# Patient Record
Sex: MALE | Race: WHITE | NOT HISPANIC OR LATINO | Employment: OTHER | URBAN - METROPOLITAN AREA
[De-identification: names, ages, dates, MRNs, and addresses within clinical notes are randomized per-mention and may not be internally consistent; named-entity substitution may affect disease eponyms.]

---

## 2017-05-17 ENCOUNTER — RECORDS - HEALTHEAST (OUTPATIENT)
Dept: LAB | Facility: CLINIC | Age: 82
End: 2017-05-17

## 2017-05-17 ENCOUNTER — HOSPITAL ENCOUNTER (OUTPATIENT)
Dept: LAB | Age: 82
Setting detail: SPECIMEN
Discharge: HOME | End: 2017-05-17

## 2017-05-17 LAB
CHOLEST SERPL-MCNC: 155 MG/DL
FASTING STATUS PATIENT QL REPORTED: NORMAL
HDLC SERPL-MCNC: 60 MG/DL
LDLC SERPL CALC-MCNC: 81 MG/DL
TRIGL SERPL-MCNC: 68 MG/DL

## 2017-09-26 ENCOUNTER — RECORDS - HEALTHEAST (OUTPATIENT)
Dept: LAB | Facility: CLINIC | Age: 82
End: 2017-09-26

## 2017-09-26 LAB
CHOLEST SERPL-MCNC: 161 MG/DL
FASTING STATUS PATIENT QL REPORTED: NORMAL
HDLC SERPL-MCNC: 60 MG/DL
LDLC SERPL CALC-MCNC: 84 MG/DL
PSA SERPL-MCNC: <0.1 NG/ML (ref 0–6.5)
TRIGL SERPL-MCNC: 83 MG/DL

## 2018-05-10 ENCOUNTER — RECORDS - HEALTHEAST (OUTPATIENT)
Dept: LAB | Facility: CLINIC | Age: 83
End: 2018-05-10

## 2018-05-10 LAB
ANION GAP SERPL CALCULATED.3IONS-SCNC: 7 MMOL/L (ref 5–18)
BUN SERPL-MCNC: 14 MG/DL (ref 8–28)
CALCIUM SERPL-MCNC: 8.6 MG/DL (ref 8.5–10.5)
CHLORIDE BLD-SCNC: 106 MMOL/L (ref 98–107)
CHOLEST SERPL-MCNC: 144 MG/DL
CO2 SERPL-SCNC: 27 MMOL/L (ref 22–31)
CREAT SERPL-MCNC: 0.79 MG/DL (ref 0.7–1.3)
FASTING STATUS PATIENT QL REPORTED: NORMAL
GFR SERPL CREATININE-BSD FRML MDRD: >60 ML/MIN/1.73M2
GLUCOSE BLD-MCNC: 100 MG/DL (ref 70–125)
HDLC SERPL-MCNC: 55 MG/DL
LDLC SERPL CALC-MCNC: 77 MG/DL
MAGNESIUM SERPL-MCNC: 2.2 MG/DL (ref 1.8–2.6)
POTASSIUM BLD-SCNC: 3.6 MMOL/L (ref 3.5–5)
SODIUM SERPL-SCNC: 140 MMOL/L (ref 136–145)
TRIGL SERPL-MCNC: 61 MG/DL

## 2018-10-16 ENCOUNTER — RECORDS - HEALTHEAST (OUTPATIENT)
Dept: LAB | Facility: CLINIC | Age: 83
End: 2018-10-16

## 2018-10-16 LAB
ANION GAP SERPL CALCULATED.3IONS-SCNC: 9 MMOL/L (ref 5–18)
BUN SERPL-MCNC: 15 MG/DL (ref 8–28)
CALCIUM SERPL-MCNC: 9.1 MG/DL (ref 8.5–10.5)
CHLORIDE BLD-SCNC: 104 MMOL/L (ref 98–107)
CHOLEST SERPL-MCNC: 160 MG/DL
CO2 SERPL-SCNC: 29 MMOL/L (ref 22–31)
CREAT SERPL-MCNC: 0.91 MG/DL (ref 0.7–1.3)
FASTING STATUS PATIENT QL REPORTED: NORMAL
GFR SERPL CREATININE-BSD FRML MDRD: >60 ML/MIN/1.73M2
GLUCOSE BLD-MCNC: 98 MG/DL (ref 70–125)
HDLC SERPL-MCNC: 64 MG/DL
LDLC SERPL CALC-MCNC: 82 MG/DL
POTASSIUM BLD-SCNC: 3.7 MMOL/L (ref 3.5–5)
SODIUM SERPL-SCNC: 142 MMOL/L (ref 136–145)
TRIGL SERPL-MCNC: 72 MG/DL

## 2019-05-09 ENCOUNTER — RECORDS - HEALTHEAST (OUTPATIENT)
Dept: LAB | Facility: CLINIC | Age: 84
End: 2019-05-09

## 2019-05-09 LAB
ANION GAP SERPL CALCULATED.3IONS-SCNC: 11 MMOL/L (ref 5–18)
BUN SERPL-MCNC: 13 MG/DL (ref 8–28)
CALCIUM SERPL-MCNC: 9.2 MG/DL (ref 8.5–10.5)
CHLORIDE BLD-SCNC: 105 MMOL/L (ref 98–107)
CHOLEST SERPL-MCNC: 161 MG/DL
CO2 SERPL-SCNC: 26 MMOL/L (ref 22–31)
CREAT SERPL-MCNC: 0.79 MG/DL (ref 0.7–1.3)
FASTING STATUS PATIENT QL REPORTED: NORMAL
GFR SERPL CREATININE-BSD FRML MDRD: >60 ML/MIN/1.73M2
GLUCOSE BLD-MCNC: 116 MG/DL (ref 70–125)
HDLC SERPL-MCNC: 68 MG/DL
LDLC SERPL CALC-MCNC: 79 MG/DL
MAGNESIUM SERPL-MCNC: 2.5 MG/DL (ref 1.8–2.6)
POTASSIUM BLD-SCNC: 3.3 MMOL/L (ref 3.5–5)
SODIUM SERPL-SCNC: 142 MMOL/L (ref 136–145)
TRIGL SERPL-MCNC: 71 MG/DL

## 2019-06-10 ENCOUNTER — RECORDS - HEALTHEAST (OUTPATIENT)
Dept: LAB | Facility: CLINIC | Age: 84
End: 2019-06-10

## 2019-06-10 LAB — POTASSIUM BLD-SCNC: 3.7 MMOL/L (ref 3.5–5)

## 2019-07-03 ENCOUNTER — ANESTHESIA - HEALTHEAST (OUTPATIENT)
Dept: SURGERY | Facility: CLINIC | Age: 84
End: 2019-07-03

## 2019-07-03 ENCOUNTER — SURGERY - HEALTHEAST (OUTPATIENT)
Dept: SURGERY | Facility: CLINIC | Age: 84
End: 2019-07-03

## 2019-07-03 ASSESSMENT — MIFFLIN-ST. JEOR: SCORE: 1467.09

## 2019-07-15 ENCOUNTER — OFFICE VISIT - HEALTHEAST (OUTPATIENT)
Dept: SURGERY | Facility: CLINIC | Age: 84
End: 2019-07-15

## 2019-07-15 DIAGNOSIS — Z98.890 POSTOPERATIVE STATE: ICD-10-CM

## 2019-07-15 ASSESSMENT — MIFFLIN-ST. JEOR: SCORE: 1461.64

## 2019-10-16 ENCOUNTER — RECORDS - HEALTHEAST (OUTPATIENT)
Dept: LAB | Facility: CLINIC | Age: 84
End: 2019-10-16

## 2019-10-16 LAB
ANION GAP SERPL CALCULATED.3IONS-SCNC: 9 MMOL/L (ref 5–18)
BUN SERPL-MCNC: 13 MG/DL (ref 8–28)
CALCIUM SERPL-MCNC: 8.8 MG/DL (ref 8.5–10.5)
CHLORIDE BLD-SCNC: 104 MMOL/L (ref 98–107)
CHOLEST SERPL-MCNC: 149 MG/DL
CO2 SERPL-SCNC: 24 MMOL/L (ref 22–31)
CREAT SERPL-MCNC: 0.9 MG/DL (ref 0.7–1.3)
FASTING STATUS PATIENT QL REPORTED: NORMAL
GFR SERPL CREATININE-BSD FRML MDRD: >60 ML/MIN/1.73M2
GLUCOSE BLD-MCNC: 100 MG/DL (ref 70–125)
HDLC SERPL-MCNC: 60 MG/DL
LDLC SERPL CALC-MCNC: 73 MG/DL
POTASSIUM BLD-SCNC: 3.3 MMOL/L (ref 3.5–5)
SODIUM SERPL-SCNC: 137 MMOL/L (ref 136–145)
TRIGL SERPL-MCNC: 82 MG/DL

## 2019-12-24 ENCOUNTER — RECORDS - HEALTHEAST (OUTPATIENT)
Dept: LAB | Facility: CLINIC | Age: 84
End: 2019-12-24

## 2019-12-24 LAB
ANION GAP SERPL CALCULATED.3IONS-SCNC: 8 MMOL/L (ref 5–18)
BUN SERPL-MCNC: 13 MG/DL (ref 8–28)
CALCIUM SERPL-MCNC: 9.1 MG/DL (ref 8.5–10.5)
CHLORIDE BLD-SCNC: 105 MMOL/L (ref 98–107)
CO2 SERPL-SCNC: 28 MMOL/L (ref 22–31)
CREAT SERPL-MCNC: 0.83 MG/DL (ref 0.7–1.3)
GFR SERPL CREATININE-BSD FRML MDRD: >60 ML/MIN/1.73M2
GLUCOSE BLD-MCNC: 128 MG/DL (ref 70–125)
IRON SATN MFR SERPL: 16 % (ref 20–50)
IRON SERPL-MCNC: 47 UG/DL (ref 42–175)
POTASSIUM BLD-SCNC: 4.2 MMOL/L (ref 3.5–5)
SODIUM SERPL-SCNC: 141 MMOL/L (ref 136–145)
TIBC SERPL-MCNC: 291 UG/DL (ref 313–563)
TRANSFERRIN SERPL-MCNC: 233 MG/DL (ref 212–360)

## 2020-04-17 ENCOUNTER — RECORDS - HEALTHEAST (OUTPATIENT)
Dept: LAB | Facility: CLINIC | Age: 85
End: 2020-04-17

## 2020-04-17 LAB
ANION GAP SERPL CALCULATED.3IONS-SCNC: 10 MMOL/L (ref 5–18)
BUN SERPL-MCNC: 13 MG/DL (ref 8–28)
CALCIUM SERPL-MCNC: 8.6 MG/DL (ref 8.5–10.5)
CHLORIDE BLD-SCNC: 106 MMOL/L (ref 98–107)
CHOLEST SERPL-MCNC: 153 MG/DL
CO2 SERPL-SCNC: 27 MMOL/L (ref 22–31)
CREAT SERPL-MCNC: 0.86 MG/DL (ref 0.7–1.3)
FASTING STATUS PATIENT QL REPORTED: NORMAL
GFR SERPL CREATININE-BSD FRML MDRD: >60 ML/MIN/1.73M2
GLUCOSE BLD-MCNC: 100 MG/DL (ref 70–125)
HDLC SERPL-MCNC: 55 MG/DL
LDLC SERPL CALC-MCNC: 83 MG/DL
MAGNESIUM SERPL-MCNC: 2.2 MG/DL (ref 1.8–2.6)
POTASSIUM BLD-SCNC: 3.5 MMOL/L (ref 3.5–5)
SODIUM SERPL-SCNC: 143 MMOL/L (ref 136–145)
TRIGL SERPL-MCNC: 75 MG/DL

## 2020-10-15 ENCOUNTER — RECORDS - HEALTHEAST (OUTPATIENT)
Dept: LAB | Facility: CLINIC | Age: 85
End: 2020-10-15

## 2020-10-15 LAB
ANION GAP SERPL CALCULATED.3IONS-SCNC: 12 MMOL/L (ref 5–18)
BUN SERPL-MCNC: 15 MG/DL (ref 8–28)
CALCIUM SERPL-MCNC: 9 MG/DL (ref 8.5–10.5)
CHLORIDE BLD-SCNC: 105 MMOL/L (ref 98–107)
CHOLEST SERPL-MCNC: 152 MG/DL
CO2 SERPL-SCNC: 28 MMOL/L (ref 22–31)
CREAT SERPL-MCNC: 0.91 MG/DL (ref 0.7–1.3)
FASTING STATUS PATIENT QL REPORTED: NORMAL
GFR SERPL CREATININE-BSD FRML MDRD: >60 ML/MIN/1.73M2
GLUCOSE BLD-MCNC: 121 MG/DL (ref 70–125)
HDLC SERPL-MCNC: 56 MG/DL
LDLC SERPL CALC-MCNC: 85 MG/DL
POTASSIUM BLD-SCNC: 4 MMOL/L (ref 3.5–5)
SODIUM SERPL-SCNC: 145 MMOL/L (ref 136–145)
TRIGL SERPL-MCNC: 57 MG/DL

## 2021-04-06 ENCOUNTER — RECORDS - HEALTHEAST (OUTPATIENT)
Dept: LAB | Facility: CLINIC | Age: 86
End: 2021-04-06

## 2021-04-06 LAB
ANION GAP SERPL CALCULATED.3IONS-SCNC: 9 MMOL/L (ref 5–18)
BUN SERPL-MCNC: 14 MG/DL (ref 8–28)
CALCIUM SERPL-MCNC: 8.5 MG/DL (ref 8.5–10.5)
CHLORIDE BLD-SCNC: 107 MMOL/L (ref 98–107)
CHOLEST SERPL-MCNC: 155 MG/DL
CO2 SERPL-SCNC: 26 MMOL/L (ref 22–31)
CREAT SERPL-MCNC: 0.91 MG/DL (ref 0.7–1.3)
FASTING STATUS PATIENT QL REPORTED: NORMAL
GFR SERPL CREATININE-BSD FRML MDRD: >60 ML/MIN/1.73M2
GLUCOSE BLD-MCNC: 104 MG/DL (ref 70–125)
HDLC SERPL-MCNC: 55 MG/DL
LDLC SERPL CALC-MCNC: 86 MG/DL
MAGNESIUM SERPL-MCNC: 2.2 MG/DL (ref 1.8–2.6)
POTASSIUM BLD-SCNC: 3.5 MMOL/L (ref 3.5–5)
SODIUM SERPL-SCNC: 142 MMOL/L (ref 136–145)
TRIGL SERPL-MCNC: 71 MG/DL

## 2021-05-28 ENCOUNTER — RECORDS - HEALTHEAST (OUTPATIENT)
Dept: ADMINISTRATIVE | Facility: CLINIC | Age: 86
End: 2021-05-28

## 2021-05-30 ENCOUNTER — RECORDS - HEALTHEAST (OUTPATIENT)
Dept: ADMINISTRATIVE | Facility: CLINIC | Age: 86
End: 2021-05-30

## 2021-05-30 NOTE — PROGRESS NOTES
"HISTORY:  Blair Quiles is s/p open right inguinal hernia repair on July 3.  He is doing well.  He thinks he is as good as could be expected.  He took Tylenol for a couple of days after surgery.  He has been adhering to the 15 pound lifting restrictions.    PHYSICAL EXAM:  Vitals:    07/15/19 1411   BP: 90/70   Resp: 18   SpO2: 100%   Weight: 183 lb (83 kg)   Height: 5' 7.5\" (1.715 m)     GEN: No acute distress, comfortable  LUNGS: CTA bilaterally  CV: RRR  ABD: Right inguinal incision healing well without evidence of recurrent hernia with Valsalva maneuver  EXT: No cyanosis, edema or obvious abnormalities    PATHOLOGY:  \"CORD LIPOMA,\" HERNIA REPAIR AND EXCISION:     -  AGGREGATES OF ABUNDANT MATURE ADIPOSE TISSUE CONSISTENT WITH CORD LIPOMA, WITH MARKED VASCULAR CONGESTION    ASSESSMENT:  Blair Quiles is s/p open right anal hernia repair    PLAN:  Intraoperative findings discussed with patient  Maintain lifting restrictions until the beginning of August.  At that time, he can advance his weight lifting as tolerated.  Okay for showering and soaking incision in water as needed  Return to surgery clinic as needed    Sabina Dominguez,   [facility]  [phone]    "

## 2021-05-30 NOTE — ANESTHESIA POSTPROCEDURE EVALUATION
Patient: Blair Quiles  REPAIR, HERNIA, INGUINAL, OPEN  Anesthesia type: general    Patient location: PACU  Last vitals:   Vitals Value Taken Time   /66 7/3/2019 12:15 PM   Temp 36.9  C (98.5  F) 7/3/2019 12:15 PM   Pulse 71 7/3/2019 12:15 PM   Resp 16 7/3/2019 12:15 PM   SpO2 92 % 7/3/2019 12:15 PM     Post vital signs: stable  Level of consciousness: awake and responds to simple questions  Post-anesthesia pain: pain controlled  Post-anesthesia nausea and vomiting: no  Pulmonary: unassisted, return to baseline  Cardiovascular: stable and blood pressure at baseline  Hydration: adequate  Anesthetic events: no    QCDR Measures:  ASA# 11 - Terese-op Cardiac Arrest: ASA11B - Patient did NOT experience unanticipated cardiac arrest  ASA# 12 - Terese-op Mortality Rate: ASA12B - Patient did NOT die  ASA# 13 - PACU Re-Intubation Rate: ASA13B - Patient did NOT require a new airway mgmt  ASA# 10 - Composite Anes Safety: ASA10A - No serious adverse event    Additional Notes:

## 2021-05-30 NOTE — ANESTHESIA CARE TRANSFER NOTE
Last vitals:   Vitals:    07/03/19 0920   BP: 166/79   Pulse: 82   Resp: 16   Temp: 37.1  C (98.7  F)   SpO2: 96%     Patient's level of consciousness is awake  Spontaneous respirations: yes  Maintains airway independently: yes  Dentition unchanged: yes  Oropharynx: oropharynx clear of all foreign objects    QCDR Measures:  ASA# 20 - Surgical Safety Checklist: WHO surgical safety checklist completed prior to induction    PQRS# 430 - Adult PONV Prevention: 4558F - Pt received => 2 anti-emetic agents (different classes) preop & intraop  ASA# 8 - Peds PONV Prevention: NA - Not pediatric patient, not GA or 2 or more risk factors NOT present  PQRS# 424 - Terese-op Temp Management: 4559F - At least one body temp DOCUMENTED => 35.5C or 95.9F within required timeframe  PQRS# 426 - PACU Transfer Protocol: - Transfer of care checklist used  ASA# 14 - Acute Post-op Pain: ASA14B - Patient did NOT experience pain >= 7 out of 10

## 2021-05-30 NOTE — ANESTHESIA PREPROCEDURE EVALUATION
Anesthesia Evaluation      Patient summary reviewed   No history of anesthetic complications     Airway   Mallampati: II  Neck ROM: full   Pulmonary - negative ROS and normal exam                          Cardiovascular - normal exam   Neuro/Psych - negative ROS     Endo/Other - negative ROS      GI/Hepatic/Renal - negative ROS           Dental    (+) poor dentition                       Anesthesia Plan  Planned anesthetic: peripheral nerve block and general LMA    ASA 2   Induction: intravenous   Anesthetic plan and risks discussed with: patient

## 2021-06-03 VITALS — BODY MASS INDEX: 27.92 KG/M2 | WEIGHT: 184.2 LBS | HEIGHT: 68 IN

## 2021-06-03 VITALS — WEIGHT: 183 LBS | BODY MASS INDEX: 27.74 KG/M2 | HEIGHT: 68 IN

## 2021-06-16 PROBLEM — K40.30 INCARCERATED RIGHT INGUINAL HERNIA: Status: ACTIVE | Noted: 2019-07-03

## 2021-06-16 PROBLEM — K40.90 NON-RECURRENT UNILATERAL INGUINAL HERNIA WITHOUT OBSTRUCTION OR GANGRENE: Status: ACTIVE | Noted: 2019-07-03

## 2021-10-12 ENCOUNTER — LAB REQUISITION (OUTPATIENT)
Dept: LAB | Facility: CLINIC | Age: 86
End: 2021-10-12
Payer: MEDICARE

## 2021-10-12 DIAGNOSIS — M85.80 OTHER SPECIFIED DISORDERS OF BONE DENSITY AND STRUCTURE, UNSPECIFIED SITE: ICD-10-CM

## 2021-10-12 DIAGNOSIS — I10 ESSENTIAL (PRIMARY) HYPERTENSION: ICD-10-CM

## 2021-10-12 DIAGNOSIS — E78.49 OTHER HYPERLIPIDEMIA: ICD-10-CM

## 2021-10-12 DIAGNOSIS — D50.8 OTHER IRON DEFICIENCY ANEMIAS: ICD-10-CM

## 2021-10-12 LAB
ANION GAP SERPL CALCULATED.3IONS-SCNC: 12 MMOL/L (ref 5–18)
BUN SERPL-MCNC: 10 MG/DL (ref 8–28)
CALCIUM SERPL-MCNC: 8.9 MG/DL (ref 8.5–10.5)
CHLORIDE BLD-SCNC: 105 MMOL/L (ref 98–107)
CHOLEST SERPL-MCNC: 165 MG/DL
CO2 SERPL-SCNC: 24 MMOL/L (ref 22–31)
CREAT SERPL-MCNC: 0.84 MG/DL (ref 0.7–1.3)
GFR SERPL CREATININE-BSD FRML MDRD: 77 ML/MIN/1.73M2
GLUCOSE BLD-MCNC: 118 MG/DL (ref 70–125)
HDLC SERPL-MCNC: 67 MG/DL
IRON SATN MFR SERPL: 56 % (ref 20–50)
IRON SERPL-MCNC: 162 UG/DL (ref 42–175)
LDLC SERPL CALC-MCNC: 82 MG/DL
POTASSIUM BLD-SCNC: 3.4 MMOL/L (ref 3.5–5)
SODIUM SERPL-SCNC: 141 MMOL/L (ref 136–145)
TIBC SERPL-MCNC: 291 UG/DL (ref 313–563)
TRANSFERRIN SERPL-MCNC: 233 MG/DL (ref 212–360)
TRIGL SERPL-MCNC: 81 MG/DL
VIT B12 SERPL-MCNC: 665 PG/ML (ref 213–816)

## 2021-10-12 PROCEDURE — 82306 VITAMIN D 25 HYDROXY: CPT | Mod: ORL | Performed by: FAMILY MEDICINE

## 2021-10-12 PROCEDURE — 80061 LIPID PANEL: CPT | Mod: ORL | Performed by: FAMILY MEDICINE

## 2021-10-12 PROCEDURE — 84466 ASSAY OF TRANSFERRIN: CPT | Mod: ORL | Performed by: FAMILY MEDICINE

## 2021-10-12 PROCEDURE — 82607 VITAMIN B-12: CPT | Mod: ORL | Performed by: FAMILY MEDICINE

## 2021-10-12 PROCEDURE — 80048 BASIC METABOLIC PNL TOTAL CA: CPT | Mod: ORL | Performed by: FAMILY MEDICINE

## 2021-10-13 LAB — DEPRECATED CALCIDIOL+CALCIFEROL SERPL-MC: 41 UG/L (ref 30–80)

## 2021-11-15 ENCOUNTER — LAB REQUISITION (OUTPATIENT)
Dept: LAB | Facility: CLINIC | Age: 86
End: 2021-11-15
Payer: MEDICARE

## 2021-11-15 DIAGNOSIS — R89.9 UNSPECIFIED ABNORMAL FINDING IN SPECIMENS FROM OTHER ORGANS, SYSTEMS AND TISSUES: ICD-10-CM

## 2021-11-15 LAB
ANION GAP SERPL CALCULATED.3IONS-SCNC: 9 MMOL/L (ref 5–18)
BUN SERPL-MCNC: 11 MG/DL (ref 8–28)
CALCIUM SERPL-MCNC: 9.2 MG/DL (ref 8.5–10.5)
CHLORIDE BLD-SCNC: 105 MMOL/L (ref 98–107)
CO2 SERPL-SCNC: 27 MMOL/L (ref 22–31)
CREAT SERPL-MCNC: 0.84 MG/DL (ref 0.7–1.3)
GFR SERPL CREATININE-BSD FRML MDRD: 77 ML/MIN/1.73M2
GLUCOSE BLD-MCNC: 106 MG/DL (ref 70–125)
POTASSIUM BLD-SCNC: 4 MMOL/L (ref 3.5–5)
SODIUM SERPL-SCNC: 141 MMOL/L (ref 136–145)

## 2021-11-15 PROCEDURE — 80048 BASIC METABOLIC PNL TOTAL CA: CPT | Mod: ORL | Performed by: FAMILY MEDICINE

## 2022-03-23 ENCOUNTER — HOSPITAL ENCOUNTER (EMERGENCY)
Facility: CLINIC | Age: 87
Discharge: HOME | End: 2022-03-23
Attending: STUDENT IN AN ORGANIZED HEALTH CARE EDUCATION/TRAINING PROGRAM | Admitting: STUDENT IN AN ORGANIZED HEALTH CARE EDUCATION/TRAINING PROGRAM
Payer: MEDICARE

## 2022-03-23 ENCOUNTER — APPOINTMENT (OUTPATIENT)
Dept: CT IMAGING | Facility: CLINIC | Age: 87
End: 2022-03-23
Attending: STUDENT IN AN ORGANIZED HEALTH CARE EDUCATION/TRAINING PROGRAM
Payer: MEDICARE

## 2022-03-23 VITALS
RESPIRATION RATE: 18 BRPM | DIASTOLIC BLOOD PRESSURE: 66 MMHG | WEIGHT: 175 LBS | OXYGEN SATURATION: 96 % | HEART RATE: 55 BPM | SYSTOLIC BLOOD PRESSURE: 152 MMHG | TEMPERATURE: 98 F | BODY MASS INDEX: 27.47 KG/M2 | HEIGHT: 67 IN

## 2022-03-23 DIAGNOSIS — S00.03XA HEMATOMA OF SCALP, INITIAL ENCOUNTER: ICD-10-CM

## 2022-03-23 PROCEDURE — 70450 CT HEAD/BRAIN W/O DYE: CPT

## 2022-03-23 PROCEDURE — 99284 EMERGENCY DEPT VISIT MOD MDM: CPT | Mod: 25

## 2022-03-23 ASSESSMENT — ENCOUNTER SYMPTOMS
SHORTNESS OF BREATH: 0
WOUND: 1
WEAKNESS: 0
HEADACHES: 0
NAUSEA: 0
NECK PAIN: 0
NUMBNESS: 0
BACK PAIN: 0

## 2022-03-23 NOTE — ED TRIAGE NOTES
Patient rolled out of bed at 07:00 hitting head on arm of chair.    Was having a dream and rolled out of bed.  Sustained a abrasion to the right temporal area of head. `1 cm  Does have swelling around area.    Is on baby asprin daily.

## 2022-03-23 NOTE — ED NOTES
Patient was having a bad dream in his bed rolled over and hit his head on a fabric covered chair. Has a small abrasion on the right temporal area with some swelling. No Neuro deficits. Pain 1/10 no change in vision denies Headache at this time.

## 2022-03-23 NOTE — ED PROVIDER NOTES
EMERGENCY DEPARTMENT ENCOUNTER      NAME: Blair Quiles  AGE: 89 year old male  YOB: 1931  MRN: [id number]  EVALUATION DATE & TIME: 3/23/2022  8:57 AM    PCP: No primary care provider on file.    ED PROVIDER: Naresh Velásquez M.D.      Chief Complaint   Patient presents with     Fall     @ 07:00  hit head on arm of chair         FINAL IMPRESSION:  1. Hematoma of scalp, initial encounter          ED COURSE & MEDICAL DECISION MAKING:    Pertinent Labs & Imaging studies reviewed. (See chart for details)  89 year old male presents to the Emergency Department for evaluation of head pain.  Patient had a mechanical fall.  Patient had a hematoma to his right temple.  No other pains or signs of trauma.  CT scan of the head shows no intracranial process.  Will discharge patient with hematoma care instructions.    9:08 AM I introduced myself to the patient, performed my physical exam, and discussed plan for ED workup.  10:07 AM Rechecked and updated the patient. We discussed the plan for discharge and the patient is agreeable. Reviewed supportive cares, symptomatic treatment, outpatient follow up, and reasons to return to the Emergency Department. Patient to be discharged by ED RN.    At the conclusion of the encounter I discussed the results of all of the tests and the disposition. The questions were answered. The patient or family acknowledged understanding and was agreeable with the care plan.           MEDICATIONS GIVEN IN THE EMERGENCY:  Medications - No data to display    NEW PRESCRIPTIONS STARTED AT TODAY'S ER VISIT  Discharge Medication List as of 3/23/2022 10:32 AM             =================================================================    HPI    Patient information was obtained from: Patient    Use of : N/A       Blair Quiles is a 89 year old male with a pertinent history of HTN, cardiac arrhythmia, hyperlipidemia, and iron deficiency anemia who presents to this ED by walk in for  evaluation after a fall.    The patient reports he was having a bad dream and fell out of bed, hitting the right side of his head on the arm of a chair. He immediately woke up and did not lose consciousness. The fall occurred around 0700 this morning. He noticed a welt to the right side of his head and has applied ice to th area for 20 minutes two times prior to arrival. He does not have any pain around the welt or headache.     He denies neck pain, chest pain, back pain, headache, vision changes, extremity numbness, weakness, or tinging, nausea, and any other complaints at this time.    He currently takes a bay aspirin daily, no other blood thinning medications.     He also reports a fall two weeks ago and hit the top of his head.    REVIEW OF SYSTEMS   Review of Systems   Eyes: Negative for visual disturbance.   Respiratory: Negative for shortness of breath.    Gastrointestinal: Negative for nausea.   Musculoskeletal: Negative for back pain and neck pain.   Skin: Positive for wound (welt to right side of head).   Neurological: Negative for syncope, weakness, numbness and headaches.   All other systems reviewed and are negative.       PAST MEDICAL HISTORY:  History reviewed. No pertinent past medical history.    PAST SURGICAL HISTORY:  Past Surgical History:   Procedure Laterality Date     INGUINAL HERNIA REPAIR Right 7/3/2019    Procedure: REPAIR, HERNIA, INGUINAL, OPEN;  Surgeon: Sabina Dominguez DO;  [facility]: [facility];  Service: General     PROSTATECTOMY             CURRENT MEDICATIONS:    amLODIPine (NORVASC) 5 MG tablet  aspirin 81 MG EC tablet  atorvastatin (LIPITOR) 20 MG tablet  lisinopril-hydrochlorothiazide (PRINZIDE,ZESTORETIC) 20-12.5 mg per tablet  LORazepam (ATIVAN) 0.5 MG tablet  mirtazapine (REMERON) 30 MG tablet  multivitamin therapeutic tablet  potassium chloride (K-DUR,KLOR-CON) 20 MEQ tablet  traZODone (DESYREL) 50 MG tablet        ALLERGIES:  No Known Allergies    FAMILY  "HISTORY:  History reviewed. No pertinent family history.    SOCIAL HISTORY:   Social History     Socioeconomic History     Marital status:      Spouse name: Not on file     Number of children: Not on file     Years of education: Not on file     Highest education level: Not on file   Occupational History     Not on file   Tobacco Use     Smoking status: Never Smoker     Smokeless tobacco: Never Used   Substance and Sexual Activity     Alcohol use: Yes     Alcohol/week: 14.0 standard drinks     Comment: Alcoholic Drinks/day: 2 glasses of wine / night     Drug use: Never     Sexual activity: Not on file   Other Topics Concern     Not on file   Social History Narrative     Not on file     Social Determinants of Health     Financial Resource Strain: Not on file   Food Insecurity: Not on file   Transportation Needs: Not on file   Physical Activity: Not on file   Stress: Not on file   Social Connections: Not on file   Intimate Partner Violence: Not on file   Housing Stability: Not on file       VITALS:  BP (!) 152/66   Pulse 55   Temp 98  F (36.7  C) (Temporal)   Resp 18   Ht 1.702 m (5' 7\")   Wt 79.4 kg (175 lb)   SpO2 96%   BMI 27.41 kg/m        PHYSICAL EXAM    VITAL SIGNS: BP (!) 152/66   Pulse 55   Temp 98  F (36.7  C) (Temporal)   Resp 18   Ht 1.702 m (5' 7\")   Wt 79.4 kg (175 lb)   SpO2 96%   BMI 27.41 kg/m    Constitutional:  Well developed, well nourished  EYES: Conjunctivae clear, no discharge  HENT: Atraumatic, normocephalic, bilateral external ears normal.  Oropharynx moist. Nose normal.   Neck: Normal ROM , Supple   Respiratory:  No respiratory distress, normal nonlabored respirations.   Cardiovascular:  Distal perfusion appears intact  Musculoskeletal:  No edema appreciated, No cyanosis, No clubbing. Good range of motion in all major joints. No midline cervical tenderness. No signs of trauma except a 2.5 cm right temporal hematoma with an overlying superficial abrasion.  Integument:  " Warm, Dry, No erythema, No rash.   Neurologic:  Alert and oriented. No focal deficits noted.  Ambulatory  Psychiatric:  Affect normal          LAB:  All pertinent labs reviewed and interpreted.  Labs Ordered and Resulted from Time of ED Arrival to Time of ED Departure - No data to display    RADIOLOGY:  Reviewed all pertinent imaging. Please see official radiology report.  Head CT w/o contrast   Final Result   IMPRESSION:   1.  Right frontotemporal scalp hematoma/contusion.      2.  No finding for intracranial hemorrhage, mass, or acute infarct.      3.  Mild volume loss.               I, Alok Leblanc, am serving as a scribe to document services personally performed by Dr. Naresh Velásquez based on my observation and the provider's statements to me. I, Naresh Velásquez MD attest that Alok Leblanc is acting in a scribe capacity, has observed my performance of the services and has documented them in accordance with my direction.    Naresh Velásquez M.D.  Emergency Medicine  [facility] EMERGENCY ROOM  [address] [id number] [phone]  Dept: [phone]     Naresh Velásquez MD  03/23/22 7242

## 2022-04-12 ENCOUNTER — LAB REQUISITION (OUTPATIENT)
Dept: LAB | Facility: CLINIC | Age: 87
End: 2022-04-12
Payer: MEDICARE

## 2022-04-12 DIAGNOSIS — E78.49 OTHER HYPERLIPIDEMIA: ICD-10-CM

## 2022-04-12 DIAGNOSIS — I10 ESSENTIAL (PRIMARY) HYPERTENSION: ICD-10-CM

## 2022-04-12 LAB
ANION GAP SERPL CALCULATED.3IONS-SCNC: 12 MMOL/L (ref 5–18)
BUN SERPL-MCNC: 14 MG/DL (ref 8–28)
CALCIUM SERPL-MCNC: 8.6 MG/DL (ref 8.5–10.5)
CHLORIDE BLD-SCNC: 105 MMOL/L (ref 98–107)
CHOLEST SERPL-MCNC: 152 MG/DL
CO2 SERPL-SCNC: 25 MMOL/L (ref 22–31)
CREAT SERPL-MCNC: 0.84 MG/DL (ref 0.7–1.3)
FASTING STATUS PATIENT QL REPORTED: NORMAL
GFR SERPL CREATININE-BSD FRML MDRD: 83 ML/MIN/1.73M2
GLUCOSE BLD-MCNC: 105 MG/DL (ref 70–125)
HDLC SERPL-MCNC: 54 MG/DL
LDLC SERPL CALC-MCNC: 84 MG/DL
MAGNESIUM SERPL-MCNC: 2.3 MG/DL (ref 1.8–2.6)
POTASSIUM BLD-SCNC: 3.3 MMOL/L (ref 3.5–5)
SODIUM SERPL-SCNC: 142 MMOL/L (ref 136–145)
TRIGL SERPL-MCNC: 69 MG/DL

## 2022-04-12 PROCEDURE — 83735 ASSAY OF MAGNESIUM: CPT | Mod: ORL | Performed by: FAMILY MEDICINE

## 2022-04-12 PROCEDURE — 80061 LIPID PANEL: CPT | Mod: ORL | Performed by: FAMILY MEDICINE

## 2022-04-12 PROCEDURE — 80048 BASIC METABOLIC PNL TOTAL CA: CPT | Mod: ORL | Performed by: FAMILY MEDICINE

## 2022-05-02 ENCOUNTER — LAB REQUISITION (OUTPATIENT)
Dept: LAB | Facility: CLINIC | Age: 87
End: 2022-05-02
Payer: MEDICARE

## 2022-05-02 DIAGNOSIS — E87.6 HYPOKALEMIA: ICD-10-CM

## 2022-05-02 LAB — POTASSIUM BLD-SCNC: 4.1 MMOL/L (ref 3.5–5)

## 2022-05-02 PROCEDURE — 84132 ASSAY OF SERUM POTASSIUM: CPT | Mod: ORL | Performed by: FAMILY MEDICINE

## 2022-10-18 ENCOUNTER — LAB REQUISITION (OUTPATIENT)
Dept: LAB | Facility: CLINIC | Age: 87
End: 2022-10-18
Payer: MEDICARE

## 2022-10-18 DIAGNOSIS — E78.49 OTHER HYPERLIPIDEMIA: ICD-10-CM

## 2022-10-18 DIAGNOSIS — I10 ESSENTIAL (PRIMARY) HYPERTENSION: ICD-10-CM

## 2022-10-18 LAB
ANION GAP SERPL CALCULATED.3IONS-SCNC: 10 MMOL/L (ref 7–15)
BUN SERPL-MCNC: 13 MG/DL (ref 8–23)
CALCIUM SERPL-MCNC: 9.1 MG/DL (ref 8.2–9.6)
CHLORIDE SERPL-SCNC: 103 MMOL/L (ref 98–107)
CHOLEST SERPL-MCNC: 150 MG/DL
CREAT SERPL-MCNC: 0.94 MG/DL (ref 0.67–1.17)
DEPRECATED HCO3 PLAS-SCNC: 25 MMOL/L (ref 22–29)
GFR SERPL CREATININE-BSD FRML MDRD: 77 ML/MIN/1.73M2
GLUCOSE SERPL-MCNC: 106 MG/DL (ref 70–99)
HDLC SERPL-MCNC: 65 MG/DL
LDLC SERPL CALC-MCNC: 72 MG/DL
NONHDLC SERPL-MCNC: 85 MG/DL
POTASSIUM SERPL-SCNC: 4 MMOL/L (ref 3.4–5.3)
SODIUM SERPL-SCNC: 138 MMOL/L (ref 136–145)
TRIGL SERPL-MCNC: 67 MG/DL

## 2022-10-18 PROCEDURE — 80048 BASIC METABOLIC PNL TOTAL CA: CPT | Mod: ORL | Performed by: FAMILY MEDICINE

## 2022-10-18 PROCEDURE — 80061 LIPID PANEL: CPT | Mod: ORL | Performed by: FAMILY MEDICINE

## 2023-04-03 ENCOUNTER — APPOINTMENT (OUTPATIENT)
Dept: ULTRASOUND IMAGING | Facility: CLINIC | Age: 88
DRG: 418 | End: 2023-04-03
Attending: EMERGENCY MEDICINE
Payer: MEDICARE

## 2023-04-03 ENCOUNTER — HOSPITAL ENCOUNTER (INPATIENT)
Facility: CLINIC | Age: 88
LOS: 1 days | Discharge: HOME | DRG: 418 | End: 2023-04-06
Attending: EMERGENCY MEDICINE | Admitting: INTERNAL MEDICINE
Payer: MEDICARE

## 2023-04-03 ENCOUNTER — APPOINTMENT (OUTPATIENT)
Dept: CT IMAGING | Facility: CLINIC | Age: 88
DRG: 418 | End: 2023-04-03
Attending: EMERGENCY MEDICINE
Payer: MEDICARE

## 2023-04-03 DIAGNOSIS — E87.6 HYPOKALEMIA: ICD-10-CM

## 2023-04-03 DIAGNOSIS — K81.0 ACUTE CHOLECYSTITIS: Primary | ICD-10-CM

## 2023-04-03 LAB
ALBUMIN SERPL-MCNC: 3.9 G/DL (ref 3.5–5)
ALBUMIN UR-MCNC: 10 MG/DL
ALP SERPL-CCNC: 99 U/L (ref 45–120)
ALT SERPL W P-5'-P-CCNC: 109 U/L (ref 0–45)
ANION GAP SERPL CALCULATED.3IONS-SCNC: 10 MMOL/L (ref 5–18)
APPEARANCE UR: CLEAR
AST SERPL W P-5'-P-CCNC: 156 U/L (ref 0–40)
BASOPHILS # BLD AUTO: 0 10E3/UL (ref 0–0.2)
BASOPHILS NFR BLD AUTO: 0 %
BILIRUB SERPL-MCNC: 2.6 MG/DL (ref 0–1)
BILIRUB UR QL STRIP: NEGATIVE
BUN SERPL-MCNC: 12 MG/DL (ref 8–28)
CALCIUM SERPL-MCNC: 9.1 MG/DL (ref 8.5–10.5)
CHLORIDE BLD-SCNC: 103 MMOL/L (ref 98–107)
CO2 SERPL-SCNC: 25 MMOL/L (ref 22–31)
COLOR UR AUTO: YELLOW
CREAT SERPL-MCNC: 0.82 MG/DL (ref 0.7–1.3)
EOSINOPHIL # BLD AUTO: 0.1 10E3/UL (ref 0–0.7)
EOSINOPHIL NFR BLD AUTO: 1 %
ERYTHROCYTE [DISTWIDTH] IN BLOOD BY AUTOMATED COUNT: 12.8 % (ref 10–15)
GFR SERPL CREATININE-BSD FRML MDRD: 83 ML/MIN/1.73M2
GLUCOSE BLD-MCNC: 135 MG/DL (ref 70–125)
GLUCOSE UR STRIP-MCNC: NEGATIVE MG/DL
HCT VFR BLD AUTO: 46.9 % (ref 40–53)
HGB BLD-MCNC: 17 G/DL (ref 13.3–17.7)
HGB UR QL STRIP: ABNORMAL
HOLD SPECIMEN: NORMAL
HYALINE CASTS: 3 /LPF
IMM GRANULOCYTES # BLD: 0 10E3/UL
IMM GRANULOCYTES NFR BLD: 0 %
KETONES UR STRIP-MCNC: ABNORMAL MG/DL
LEUKOCYTE ESTERASE UR QL STRIP: NEGATIVE
LIPASE SERPL-CCNC: 75 U/L (ref 0–52)
LYMPHOCYTES # BLD AUTO: 0.6 10E3/UL (ref 0.8–5.3)
LYMPHOCYTES NFR BLD AUTO: 7 %
MCH RBC QN AUTO: 34.1 PG (ref 26.5–33)
MCHC RBC AUTO-ENTMCNC: 36.2 G/DL (ref 31.5–36.5)
MCV RBC AUTO: 94 FL (ref 78–100)
MONOCYTES # BLD AUTO: 0.7 10E3/UL (ref 0–1.3)
MONOCYTES NFR BLD AUTO: 8 %
MUCOUS THREADS #/AREA URNS LPF: PRESENT /LPF
NEUTROPHILS # BLD AUTO: 7.3 10E3/UL (ref 1.6–8.3)
NEUTROPHILS NFR BLD AUTO: 84 %
NITRATE UR QL: NEGATIVE
NRBC # BLD AUTO: 0 10E3/UL
NRBC BLD AUTO-RTO: 0 /100
PH UR STRIP: 6.5 [PH] (ref 5–7)
PLATELET # BLD AUTO: 193 10E3/UL (ref 150–450)
POTASSIUM BLD-SCNC: 3.4 MMOL/L (ref 3.5–5)
PROT SERPL-MCNC: 6.8 G/DL (ref 6–8)
RBC # BLD AUTO: 4.99 10E6/UL (ref 4.4–5.9)
RBC URINE: 26 /HPF
SODIUM SERPL-SCNC: 138 MMOL/L (ref 136–145)
SP GR UR STRIP: 1.02 (ref 1–1.03)
TROPONIN I SERPL-MCNC: 0.02 NG/ML (ref 0–0.29)
UROBILINOGEN UR STRIP-MCNC: <2 MG/DL
WBC # BLD AUTO: 8.7 10E3/UL (ref 4–11)
WBC URINE: 1 /HPF

## 2023-04-03 PROCEDURE — 84484 ASSAY OF TROPONIN QUANT: CPT | Performed by: EMERGENCY MEDICINE

## 2023-04-03 PROCEDURE — 76705 ECHO EXAM OF ABDOMEN: CPT

## 2023-04-03 PROCEDURE — 85014 HEMATOCRIT: CPT | Performed by: EMERGENCY MEDICINE

## 2023-04-03 PROCEDURE — 99285 EMERGENCY DEPT VISIT HI MDM: CPT | Mod: 25

## 2023-04-03 PROCEDURE — 93005 ELECTROCARDIOGRAM TRACING: CPT | Performed by: EMERGENCY MEDICINE

## 2023-04-03 PROCEDURE — 74177 CT ABD & PELVIS W/CONTRAST: CPT | Mod: MG

## 2023-04-03 PROCEDURE — 80053 COMPREHEN METABOLIC PANEL: CPT | Performed by: EMERGENCY MEDICINE

## 2023-04-03 PROCEDURE — 250N000011 HC RX IP 250 OP 636: Performed by: EMERGENCY MEDICINE

## 2023-04-03 PROCEDURE — 83735 ASSAY OF MAGNESIUM: CPT | Performed by: INTERNAL MEDICINE

## 2023-04-03 PROCEDURE — 36415 COLL VENOUS BLD VENIPUNCTURE: CPT | Performed by: EMERGENCY MEDICINE

## 2023-04-03 PROCEDURE — 83690 ASSAY OF LIPASE: CPT | Performed by: EMERGENCY MEDICINE

## 2023-04-03 PROCEDURE — 96375 TX/PRO/DX INJ NEW DRUG ADDON: CPT

## 2023-04-03 PROCEDURE — G1010 CDSM STANSON: HCPCS

## 2023-04-03 PROCEDURE — 81001 URINALYSIS AUTO W/SCOPE: CPT | Performed by: EMERGENCY MEDICINE

## 2023-04-03 RX ORDER — IOPAMIDOL 755 MG/ML
100 INJECTION, SOLUTION INTRAVASCULAR ONCE
Status: COMPLETED | OUTPATIENT
Start: 2023-04-03 | End: 2023-04-03

## 2023-04-03 RX ORDER — UBIDECARENONE 75 MG
100 CAPSULE ORAL DAILY
COMMUNITY

## 2023-04-03 RX ORDER — KETOROLAC TROMETHAMINE 15 MG/ML
15 INJECTION, SOLUTION INTRAMUSCULAR; INTRAVENOUS ONCE
Status: COMPLETED | OUTPATIENT
Start: 2023-04-03 | End: 2023-04-03

## 2023-04-03 RX ORDER — ONDANSETRON 2 MG/ML
4 INJECTION INTRAMUSCULAR; INTRAVENOUS ONCE
Status: COMPLETED | OUTPATIENT
Start: 2023-04-03 | End: 2023-04-03

## 2023-04-03 RX ORDER — PIPERACILLIN SODIUM, TAZOBACTAM SODIUM 3; .375 G/15ML; G/15ML
3.38 INJECTION, POWDER, LYOPHILIZED, FOR SOLUTION INTRAVENOUS ONCE
Status: COMPLETED | OUTPATIENT
Start: 2023-04-03 | End: 2023-04-04

## 2023-04-03 RX ORDER — FERROUS SULFATE 325(65) MG
325 TABLET ORAL
COMMUNITY

## 2023-04-03 RX ORDER — MORPHINE SULFATE 4 MG/ML
4 INJECTION, SOLUTION INTRAMUSCULAR; INTRAVENOUS ONCE
Status: COMPLETED | OUTPATIENT
Start: 2023-04-03 | End: 2023-04-03

## 2023-04-03 RX ADMIN — MORPHINE SULFATE 4 MG: 4 INJECTION, SOLUTION INTRAMUSCULAR; INTRAVENOUS at 19:55

## 2023-04-03 RX ADMIN — KETOROLAC TROMETHAMINE 15 MG: 15 INJECTION, SOLUTION INTRAMUSCULAR; INTRAVENOUS at 19:52

## 2023-04-03 RX ADMIN — ONDANSETRON 4 MG: 2 INJECTION INTRAMUSCULAR; INTRAVENOUS at 19:52

## 2023-04-03 RX ADMIN — IOPAMIDOL 100 ML: 755 INJECTION, SOLUTION INTRAVENOUS at 20:50

## 2023-04-03 ASSESSMENT — ENCOUNTER SYMPTOMS
VOMITING: 0
ABDOMINAL PAIN: 1
DIARRHEA: 0
FEVER: 0
CHILLS: 0

## 2023-04-03 ASSESSMENT — ACTIVITIES OF DAILY LIVING (ADL)
ADLS_ACUITY_SCORE: 35
ADLS_ACUITY_SCORE: 35

## 2023-04-03 NOTE — ED TRIAGE NOTES
Lunch at 1230 today ABD pain started 20 minutes after, no vomiting, Nonradiating upper abd. No CP or SOB.Sharp.Belching a lot.

## 2023-04-04 ENCOUNTER — APPOINTMENT (OUTPATIENT)
Dept: MRI IMAGING | Facility: CLINIC | Age: 88
DRG: 418 | End: 2023-04-04
Attending: SURGERY
Payer: MEDICARE

## 2023-04-04 PROBLEM — E87.6 HYPOKALEMIA: Status: ACTIVE | Noted: 2023-04-04

## 2023-04-04 PROBLEM — K81.0 ACUTE CHOLECYSTITIS: Status: ACTIVE | Noted: 2023-04-04

## 2023-04-04 LAB
ALBUMIN SERPL-MCNC: 3.2 G/DL (ref 3.5–5)
ALP SERPL-CCNC: 102 U/L (ref 45–120)
ALT SERPL W P-5'-P-CCNC: 171 U/L (ref 0–45)
ANION GAP SERPL CALCULATED.3IONS-SCNC: 8 MMOL/L (ref 5–18)
AST SERPL W P-5'-P-CCNC: 120 U/L (ref 0–40)
ATRIAL RATE - MUSE: 69 BPM
BILIRUB DIRECT SERPL-MCNC: 0.6 MG/DL
BILIRUB SERPL-MCNC: 1.6 MG/DL (ref 0–1)
BUN SERPL-MCNC: 9 MG/DL (ref 8–28)
CALCIUM SERPL-MCNC: 8.2 MG/DL (ref 8.5–10.5)
CHLORIDE BLD-SCNC: 106 MMOL/L (ref 98–107)
CO2 SERPL-SCNC: 25 MMOL/L (ref 22–31)
CREAT SERPL-MCNC: 0.77 MG/DL (ref 0.7–1.3)
DIASTOLIC BLOOD PRESSURE - MUSE: NORMAL MMHG
GFR SERPL CREATININE-BSD FRML MDRD: 85 ML/MIN/1.73M2
GLUCOSE BLD-MCNC: 91 MG/DL (ref 70–125)
HOLD SPECIMEN: NORMAL
INTERPRETATION ECG - MUSE: NORMAL
MAGNESIUM SERPL-MCNC: 1.9 MG/DL (ref 1.8–2.6)
MAGNESIUM SERPL-MCNC: 2.2 MG/DL (ref 1.8–2.6)
P AXIS - MUSE: 41 DEGREES
POTASSIUM BLD-SCNC: 3.2 MMOL/L (ref 3.5–5)
POTASSIUM BLD-SCNC: 3.4 MMOL/L (ref 3.5–5)
POTASSIUM BLD-SCNC: 3.8 MMOL/L (ref 3.5–5)
PR INTERVAL - MUSE: NORMAL MS
PROT SERPL-MCNC: 5.7 G/DL (ref 6–8)
QRS DURATION - MUSE: 100 MS
QT - MUSE: 460 MS
QTC - MUSE: 447 MS
R AXIS - MUSE: -8 DEGREES
SODIUM SERPL-SCNC: 139 MMOL/L (ref 136–145)
SYSTOLIC BLOOD PRESSURE - MUSE: NORMAL MMHG
T AXIS - MUSE: -38 DEGREES
VENTRICULAR RATE- MUSE: 57 BPM

## 2023-04-04 PROCEDURE — 96376 TX/PRO/DX INJ SAME DRUG ADON: CPT

## 2023-04-04 PROCEDURE — 96361 HYDRATE IV INFUSION ADD-ON: CPT

## 2023-04-04 PROCEDURE — G1010 CDSM STANSON: HCPCS

## 2023-04-04 PROCEDURE — 36415 COLL VENOUS BLD VENIPUNCTURE: CPT | Performed by: INTERNAL MEDICINE

## 2023-04-04 PROCEDURE — 250N000011 HC RX IP 250 OP 636: Performed by: EMERGENCY MEDICINE

## 2023-04-04 PROCEDURE — 250N000011 HC RX IP 250 OP 636: Performed by: INTERNAL MEDICINE

## 2023-04-04 PROCEDURE — 255N000002 HC RX 255 OP 636: Performed by: INTERNAL MEDICINE

## 2023-04-04 PROCEDURE — A9585 GADOBUTROL INJECTION: HCPCS | Performed by: INTERNAL MEDICINE

## 2023-04-04 PROCEDURE — 96365 THER/PROPH/DIAG IV INF INIT: CPT

## 2023-04-04 PROCEDURE — 83735 ASSAY OF MAGNESIUM: CPT | Performed by: INTERNAL MEDICINE

## 2023-04-04 PROCEDURE — 99222 1ST HOSP IP/OBS MODERATE 55: CPT | Mod: 57 | Performed by: SURGERY

## 2023-04-04 PROCEDURE — G0378 HOSPITAL OBSERVATION PER HR: HCPCS

## 2023-04-04 PROCEDURE — 250N000013 HC RX MED GY IP 250 OP 250 PS 637: Performed by: INTERNAL MEDICINE

## 2023-04-04 PROCEDURE — 99223 1ST HOSP IP/OBS HIGH 75: CPT | Mod: AI | Performed by: INTERNAL MEDICINE

## 2023-04-04 PROCEDURE — 84132 ASSAY OF SERUM POTASSIUM: CPT | Performed by: INTERNAL MEDICINE

## 2023-04-04 PROCEDURE — 82248 BILIRUBIN DIRECT: CPT | Performed by: INTERNAL MEDICINE

## 2023-04-04 RX ORDER — SODIUM CHLORIDE AND POTASSIUM CHLORIDE 150; 900 MG/100ML; MG/100ML
INJECTION, SOLUTION INTRAVENOUS CONTINUOUS
Status: DISCONTINUED | OUTPATIENT
Start: 2023-04-05 | End: 2023-04-06 | Stop reason: HOSPADM

## 2023-04-04 RX ORDER — SODIUM CHLORIDE AND POTASSIUM CHLORIDE 150; 900 MG/100ML; MG/100ML
INJECTION, SOLUTION INTRAVENOUS CONTINUOUS
Status: DISCONTINUED | OUTPATIENT
Start: 2023-04-04 | End: 2023-04-04

## 2023-04-04 RX ORDER — LISINOPRIL 20 MG/1
20 TABLET ORAL DAILY
Status: DISCONTINUED | OUTPATIENT
Start: 2023-04-04 | End: 2023-04-06 | Stop reason: HOSPADM

## 2023-04-04 RX ORDER — POTASSIUM CHLORIDE 1500 MG/1
20 TABLET, EXTENDED RELEASE ORAL ONCE
Status: COMPLETED | OUTPATIENT
Start: 2023-04-04 | End: 2023-04-04

## 2023-04-04 RX ORDER — POTASSIUM CHLORIDE 1500 MG/1
40 TABLET, EXTENDED RELEASE ORAL ONCE
Status: COMPLETED | OUTPATIENT
Start: 2023-04-04 | End: 2023-04-04

## 2023-04-04 RX ORDER — TRAZODONE HYDROCHLORIDE 50 MG/1
150 TABLET, FILM COATED ORAL AT BEDTIME
Status: DISCONTINUED | OUTPATIENT
Start: 2023-04-04 | End: 2023-04-06 | Stop reason: HOSPADM

## 2023-04-04 RX ORDER — AMLODIPINE BESYLATE 5 MG/1
5 TABLET ORAL DAILY
Status: DISCONTINUED | OUTPATIENT
Start: 2023-04-04 | End: 2023-04-06 | Stop reason: HOSPADM

## 2023-04-04 RX ORDER — PIPERACILLIN SODIUM, TAZOBACTAM SODIUM 3; .375 G/15ML; G/15ML
3.38 INJECTION, POWDER, LYOPHILIZED, FOR SOLUTION INTRAVENOUS EVERY 8 HOURS
Status: DISCONTINUED | OUTPATIENT
Start: 2023-04-04 | End: 2023-04-06 | Stop reason: HOSPADM

## 2023-04-04 RX ORDER — ONDANSETRON 2 MG/ML
4 INJECTION INTRAMUSCULAR; INTRAVENOUS EVERY 6 HOURS PRN
Status: DISCONTINUED | OUTPATIENT
Start: 2023-04-04 | End: 2023-04-05

## 2023-04-04 RX ORDER — GADOBUTROL 604.72 MG/ML
8 INJECTION INTRAVENOUS ONCE
Status: COMPLETED | OUTPATIENT
Start: 2023-04-04 | End: 2023-04-04

## 2023-04-04 RX ORDER — LORAZEPAM 0.5 MG/1
0.5 TABLET ORAL DAILY PRN
Status: DISCONTINUED | OUTPATIENT
Start: 2023-04-04 | End: 2023-04-06 | Stop reason: HOSPADM

## 2023-04-04 RX ORDER — MIRTAZAPINE 15 MG/1
30 TABLET, FILM COATED ORAL AT BEDTIME
Status: DISCONTINUED | OUTPATIENT
Start: 2023-04-04 | End: 2023-04-06 | Stop reason: HOSPADM

## 2023-04-04 RX ORDER — ONDANSETRON 4 MG/1
4 TABLET, ORALLY DISINTEGRATING ORAL EVERY 6 HOURS PRN
Status: DISCONTINUED | OUTPATIENT
Start: 2023-04-04 | End: 2023-04-05

## 2023-04-04 RX ADMIN — PIPERACILLIN AND TAZOBACTAM 3.38 G: 3; .375 INJECTION, POWDER, LYOPHILIZED, FOR SOLUTION INTRAVENOUS at 21:03

## 2023-04-04 RX ADMIN — PIPERACILLIN AND TAZOBACTAM 3.38 G: 3; .375 INJECTION, POWDER, LYOPHILIZED, FOR SOLUTION INTRAVENOUS at 13:11

## 2023-04-04 RX ADMIN — POTASSIUM CHLORIDE 40 MEQ: 1500 TABLET, EXTENDED RELEASE ORAL at 18:14

## 2023-04-04 RX ADMIN — TRAZODONE HYDROCHLORIDE 150 MG: 50 TABLET ORAL at 21:03

## 2023-04-04 RX ADMIN — AMLODIPINE BESYLATE 5 MG: 5 TABLET ORAL at 13:06

## 2023-04-04 RX ADMIN — POTASSIUM CHLORIDE 20 MEQ: 1500 TABLET, EXTENDED RELEASE ORAL at 13:06

## 2023-04-04 RX ADMIN — LISINOPRIL 20 MG: 10 TABLET ORAL at 13:06

## 2023-04-04 RX ADMIN — PIPERACILLIN AND TAZOBACTAM 3.38 G: 3; .375 INJECTION, POWDER, LYOPHILIZED, FOR SOLUTION INTRAVENOUS; PARENTERAL at 00:25

## 2023-04-04 RX ADMIN — GADOBUTROL 8 ML: 604.72 INJECTION INTRAVENOUS at 12:06

## 2023-04-04 RX ADMIN — MIRTAZAPINE 30 MG: 15 TABLET, FILM COATED ORAL at 21:03

## 2023-04-04 RX ADMIN — POTASSIUM CHLORIDE AND SODIUM CHLORIDE: 900; 150 INJECTION, SOLUTION INTRAVENOUS at 10:16

## 2023-04-04 RX ADMIN — POTASSIUM CHLORIDE AND SODIUM CHLORIDE: 900; 150 INJECTION, SOLUTION INTRAVENOUS at 13:08

## 2023-04-04 ASSESSMENT — ACTIVITIES OF DAILY LIVING (ADL)
DEPENDENT_IADLS:: INDEPENDENT
ADLS_ACUITY_SCORE: 22
ADLS_ACUITY_SCORE: 22
ADLS_ACUITY_SCORE: 35
ADLS_ACUITY_SCORE: 35
ADLS_ACUITY_SCORE: 22
ADLS_ACUITY_SCORE: 35
ADLS_ACUITY_SCORE: 22
ADLS_ACUITY_SCORE: 22

## 2023-04-04 NOTE — CONSULTS
GI CONSULT NOTE      Name: Blair Quiles  Medical Record #: [id number]  YOB: 1931  Date of Admission: 4/3/2023  Date/Time: 4/4/2023/11:09 AM     CHIEF COMPLAINT: Abdominal pain    HISTORY OF PRESENT ILLNESS: We were asked to see Blair Quiles by William Alva for cholelithiasis.     Blair Quiles is a 89 year old year old male with history of inguinal hernia repair, HTN, HLD who presented to the ED with acute onset post prandial abdominal pain. He had not had prodromal symptoms. ED work-up showed elevated LFTs: , , total bilirubin 2.6, alk phos 99. Lipase 75. WBC 8.7. CT with IV contrast showed cholelithiasis, RUQ US showed cholecystitis with 5mm bile duct. LFTs overall are improved today: , , total bilirubin 1.6, alk phos 102. Surgery has seen, MRCP pending.     He is back from the MRCP. Clinically, he has improved. No further abdominal pain. Daughter at bedside.     REVIEW OF SYSTEMS (ROS): Complete review of systems negative other than listed in HPI.    PAST MEDICAL HISTORY:  History reviewed. No pertinent past medical history.     FAMILY HISTORY:  History reviewed. No pertinent family history.    SOCIAL HISTORY:  Social History     Socioeconomic History     Marital status:      Spouse name: Not on file     Number of children: Not on file     Years of education: Not on file     Highest education level: Not on file   Occupational History     Not on file   Tobacco Use     Smoking status: Never     Smokeless tobacco: Never   Vaping Use     Vaping status: Not on file   Substance and Sexual Activity     Alcohol use: Yes     Alcohol/week: 14.0 standard drinks of alcohol     Comment: Alcoholic Drinks/day: 2 glasses of wine / night     Drug use: Never     Sexual activity: Not on file   Other Topics Concern     Not on file   Social History Narrative     Not on file     Social Determinants of Health     Financial Resource Strain: Not on file   Food Insecurity: Not on  file   Transportation Needs: Not on file   Physical Activity: Not on file   Stress: Not on file   Social Connections: Not on file   Intimate Partner Violence: Not on file   Housing Stability: Not on file     MEDICATIONS PRIOR TO ADMISSION: (Not in a hospital admission)       ALLERGIES: Patient has no known allergies.    PHYSICAL EXAM:    BP (!) 140/64   Pulse 52   Temp 98.5  F (36.9  C) (Oral)   Resp 18   Wt 78 kg (172 lb)   SpO2 92%   BMI 26.94 kg/m      GENERAL: Pleasant, no obvious distress. Daughter at bedside.   NECK: Supple without adenopathy  EYES: No scleral icterus  LUNGS: Clear to auscultation bilaterally  HEART: Regular rate and rhythm, S1 and S2 present, no lower extremity edema  ABDOMEN: Non-distended. Positive bowel sounds. Soft, non-tender, no guarding/rebound/mass, no obvious organomegaly  MUSKULOSKELETAL:  Warm and well perfused, moves all extremities well  SKIN: No jaundice  NEUROLOGIC: Alert and oriented  PSYCHIATRIC: Normal affect    LAB DATA:  CMP Results:   Recent Labs   Lab Test 04/04/23  0854 04/03/23  1805 10/18/22  1042 10/16/19  1444 07/03/19  0247    138 138   < > 141   POTASSIUM 3.2* 3.4* 4.0   < > 3.5   CHLORIDE 106 103 103   < > 105   CO2 25 25 25   < > 26   ANIONGAP 8 10 10   < > 10   GLC 91 135* 106*   < > 145*   BUN 9 12 13.0   < > 15   CR 0.77 0.82 0.94   < > 0.82   BILITOTAL 1.6* 2.6*  --   --  0.7   ALKPHOS 102 99  --   --  80   * 109*  --   --  23   * 156*  --   --  25    < > = values in this interval not displayed.      CBC  Recent Labs   Lab 04/03/23  1805   WBC 8.7   RBC 4.99   HGB 17.0   HCT 46.9   MCV 94   MCH 34.1*   MCHC 36.2   RDW 12.8        INRNo lab results found in last 7 days.   Lipase   Date Value Ref Range Status   04/03/2023 75 (H) 0 - 52 U/L Final   07/03/2019 18 0 - 52 U/L Final     IMAGING:  EXAM: CT ABDOMEN PELVIS W CONTRAST  LOCATION: [facility]  DATE/TIME: 4/3/2023 9:02 PM     INDICATION: severe  epigastric abd pain after lunch today. eval for aaa, raj, cause of pain  COMPARISON: 07/03/2019  TECHNIQUE: CT scan of the abdomen and pelvis was performed following injection of IV contrast. Multiplanar reformats were obtained. Dose reduction techniques were used.  CONTRAST: 100ml Isovue 370     FINDINGS:   LOWER CHEST: Stable large hiatal hernia with the majority of the stomach in the thoracic cavity. Mild bibasilar fibrosis.     HEPATOBILIARY: Distended gallbladder with the wall measuring up to 0.5 cm. Cholelithiasis. No extrahepatic biliary ductal dilatation. Stable mild intrahepatic biliary ductal dilatation in the left lobe.     PANCREAS: Stable pancreatic ductal dilatation at 0.4 cm. No mass.     SPLEEN: Normal.     ADRENAL GLANDS: Normal.     KIDNEYS/BLADDER: Simple renal cysts, no follow-up required. No hydronephrosis.     BOWEL: Diverticulosis of the colon. No acute inflammatory change. No obstruction. Normal appendix.     LYMPH NODES: Normal.     VASCULATURE: Moderate atherosclerosis. No aneurysm.     PELVIC ORGANS: Prostatectomy.     MUSCULOSKELETAL: Small fat-containing left inguinal hernia. Remote right rib fractures. Degenerative changes. Osseous demineralization.                                        IMPRESSION:   1.  Cholelithiasis with gallbladder wall thickening. This may represent acute cholecystitis. Recommend a right upper quadrant ultrasound for further characterization.  2.  Stable large hiatal hernia.    EXAM: US ABDOMEN LIMITED  LOCATION: [facility]  DATE/TIME: 4/3/2023 11:10 PM     INDICATION: epigastric pain after lunch. gall stones and wall thick on ct.  COMPARISON: CT 04/03/2023.  TECHNIQUE: Limited abdominal ultrasound.     FINDINGS:     GALLBLADDER: There is a probable small amount of sludge in the gallbladder. The gallbladder wall is thickened to 5 mm. Small amount of pericholecystic fluid. No sonographic Ramirez sign.     BILE DUCTS: No biliary  dilatation. The common duct measures 5 mm.     LIVER: Normal parenchyma with smooth contour. No focal mass. The left lobe was not well seen.     RIGHT KIDNEY: No hydronephrosis. 7.5 cm cyst at the lower pole.     PANCREAS: Obscured by bowel gas.     No ascites.                                          IMPRESSION:  1.  There is gallbladder wall thickening and a small amount of pericholecystic fluid which can be seen with acute cholecystitis. No sonographic Ramirez sign.  2.  Probable small amount of sludge in the gallbladder. No definite gallstone.    ASSESSMENT:  1. Cholelithiasis   89 year old male with acute onset abdominal pain found to have cholelithiasis on imaging with mildly elevated LFTs. Bile duct normal at 5mm. Abdominal pain has improved, and LFTs overall trending down. ? Passed stone vs. Retained stone. MRCP pending. If this shows evidence of retained stone, will review with biliary physician and likely plan for ERCP (which will need to be done at [facility]). Surgery is following, considering cholecystectomy.     PLAN:  1. Await pending MRCP results  2. Ok from our standpoint for diet today  3. Trend LFTs    Discussed with Dr. Kaylan GALVAN attending.    TIME SPENT: 25 min including chart review, patient interview and care coordination.     ADDENDUM: MRCP back, no signs of choledocholithiasis or dilated bile ducts. 5mm likely IPMN incidentally noted, discussed with family that he could have repeat MRI in 6 months for monitoring (then every 2 years if desired). Low likelihood this would evolve into cancer given age.     GI will sign off. Please call with questions.                                                Joseline Dyer PA-C  Thank you for the opportunity to participate in the care of this patient.   Please feel free to call me with any questions or concerns.  Phone number [phone].            _______________________________________________________________  [facility] Attending  Patient is seen  anddiscussed with the PA/CNP, see note above.  Had acute abd pain, now resolved.  Very mild LFT elevations, with US showing 5mm cbd.  MRCP now shows no CBD stone.  GB looks inflamed.   Exam: Abdomen soft, nontender.  Labs and imaging reviewed.  Assessment/Plan:  Acute abd pain with symptomatic cholelithiasis.  No role for ERCP.  Also small pancreas cyst that is unlikely to be significant in his lifetime.  Consider follow up pancreas imaging in 6 months.         GI will sign off.      10 minutes of total time was spent providing patient care, including patient evaluation, reviewing documentation/test results, coordination of care with other providers, and .    Jourdan Kimbrough MD  [facility]  Office: [phone]  Cell:[phone]

## 2023-04-04 NOTE — ED PROVIDER NOTES
Emergency Department Encounter      NAME: Blair Quiles  AGE: 89 year old male  YOB: 1931  MRN: [id number]  EVALUATION DATE & TIME: 4/3/2023  7:39 PM    PCP: No Ref-Primary, Physician    ED PROVIDER: Milton Raphael M.D.      Chief Complaint   Patient presents with     Abdominal Pain         FINAL IMPRESSION:  1. Acute cholecystitis    2. Hypokalemia        MEDICAL DECISION MAKIN:11 PM I met with the patient, obtained history, performed an initial exam, and discussed options and plan for diagnostics and treatment here in the ED.  9:38 PM I updated patient.   11:35 PM I updated patient with plan for admission.    This patient is a 89-year-old male who is brought to the ER for abdominal pain that started around noon today after he ate lunch.  He says the pain is severe and rated 10 out of 10 on my exam.  The pain is in his epigastric abdomen and does not radiate anywhere.  He has not noticed anything that makes it better or worse.  On exam he was tender to palpation of epigastric abdomen although this completely resolved with the Toradol and Dilaudid that we gave him.  Because of his age and severity of pain I considered a AAA, pancreatitis, perforated ulcer and cholecystitis so a CT scan with contrast was ordered.  The scan showed signs of cholecystitis but ultrasound was recommended.  An ultrasound was then done and confirmed the cholecystitis with thickening of the gallbladder wall up to 5 mm.  Patient had received a dose of Zosyn while in the ER.  I discussed patient's case with general surgery, Dr. Banda.  I have admitted the patient to the hospitalist service for a pre-op exam.      Pertinent Labs & Imaging studies reviewed. (See chart for details)      Medical Decision Making    History:    Supplemental history from: Documented in chart, if applicable    External Record(s) reviewed: Documented in chart, if applicable.    Work Up:    Chart documentation includes differential  considered and any EKGs or imaging independently interpreted by provider, where specified.    In additional to work up documented, I considered the following work up: Documented in chart, if applicable.    External consultation:    Discussion of management with another provider: Documented in chart, if applicable    Complicating factors:    Care impacted by chronic illness: N/A    Care affected by social determinants of health: Access to Medical Care    Disposition considerations: Admit.           MEDICATIONS GIVEN IN THE EMERGENCY:  Medications   piperacillin-tazobactam (ZOSYN) 3.375 g vial to attach to  mL bag (has no administration in time range)   ketorolac (TORADOL) injection 15 mg (15 mg Intravenous $Given 4/3/23 1952)   morphine (PF) injection 4 mg (4 mg Intravenous $Given 4/3/23 1955)   ondansetron (ZOFRAN) injection 4 mg (4 mg Intravenous $Given 4/3/23 1952)   iopamidol (ISOVUE-370) solution 100 mL (100 mLs Intravenous $Given 4/3/23 2050)       NEW PRESCRIPTIONS STARTED AT TODAY'S ER VISIT:  New Prescriptions    No medications on file          =================================================================    HPI    Patient information was obtained from: Patient    Use of : N/A        Blair Quiles is a 89 year old male with a past medical history of SBO, who presents via walk-in for evaluation of abdominal pain.    Patient endorses abdominal pain that has been ongoing since 12 PM today and became worse after he ate lunch. He describes the pain as 10/10, non radiating. Patient denies any history of similar symptoms. He has had 2 bowel movements since the onset of pain.    Patient denies fever, chills, vomiting, diarrhea, and all other complaints at this time.      REVIEW OF SYSTEMS   Review of Systems   Constitutional: Negative for chills and fever.   Gastrointestinal: Positive for abdominal pain. Negative for diarrhea and vomiting.   All other systems reviewed and are negative.        PAST MEDICAL HISTORY:  History reviewed. No pertinent past medical history.    PAST SURGICAL HISTORY:  Past Surgical History:   Procedure Laterality Date     INGUINAL HERNIA REPAIR Right 7/3/2019    Procedure: REPAIR, HERNIA, INGUINAL, OPEN;  Surgeon: Sabina Dominguez DO;  Location: [facility] OR;  Service: General     PROSTATECTOMY         CURRENT MEDICATIONS:      Current Facility-Administered Medications:      piperacillin-tazobactam (ZOSYN) 3.375 g vial to attach to  mL bag, 3.375 g, Intravenous, Once, Milton Raphael MD    Current Outpatient Medications:      amLODIPine (NORVASC) 5 MG tablet, [AMLODIPINE (NORVASC) 5 MG TABLET] Take 5 mg by mouth daily., Disp: , Rfl:      aspirin 81 MG EC tablet, [ASPIRIN 81 MG EC TABLET] Take 81 mg by mouth daily., Disp: , Rfl:      atorvastatin (LIPITOR) 20 MG tablet, [ATORVASTATIN (LIPITOR) 20 MG TABLET] Take 20 mg by mouth at bedtime., Disp: , Rfl:      cholecalciferol (VITAMIN D3) 25 mcg (1000 units) capsule, Take 1 capsule by mouth daily, Disp: , Rfl:      cyanocobalamin (VITAMIN B-12) 100 MCG tablet, Take 100 mcg by mouth daily, Disp: , Rfl:      ferrous sulfate (FEROSUL) 325 (65 Fe) MG tablet, Take 325 mg by mouth daily (with breakfast), Disp: , Rfl:      lisinopril-hydrochlorothiazide (PRINZIDE,ZESTORETIC) 20-12.5 mg per tablet, [LISINOPRIL-HYDROCHLOROTHIAZIDE (PRINZIDE,ZESTORETIC) 20-12.5 MG PER TABLET] Take 1 tablet by mouth daily., Disp: , Rfl:      LORazepam (ATIVAN) 0.5 MG tablet, [LORAZEPAM (ATIVAN) 0.5 MG TABLET] Take 0.5 mg by mouth daily as needed for anxiety., Disp: , Rfl:      mirtazapine (REMERON) 30 MG tablet, [MIRTAZAPINE (REMERON) 30 MG TABLET] Take 30 mg by mouth at bedtime., Disp: , Rfl:      multivitamin therapeutic tablet, [MULTIVITAMIN THERAPEUTIC TABLET] Take 1 tablet by mouth daily., Disp: , Rfl:      potassium chloride (K-DUR,KLOR-CON) 20 MEQ tablet, [POTASSIUM CHLORIDE (K-DUR,KLOR-CON) 20 MEQ TABLET] Take 20 mEq by mouth daily.,  Disp: , Rfl:      traZODone (DESYREL) 50 MG tablet, [TRAZODONE (DESYREL) 50 MG TABLET] Take 150 mg by mouth at bedtime., Disp: , Rfl:     ALLERGIES:  No Known Allergies    FAMILY HISTORY:  History reviewed. No pertinent family history.    SOCIAL HISTORY:   Social History     Socioeconomic History     Marital status:    Tobacco Use     Smoking status: Never     Smokeless tobacco: Never   Substance and Sexual Activity     Alcohol use: Yes     Alcohol/week: 14.0 standard drinks of alcohol     Comment: Alcoholic Drinks/day: 2 glasses of wine / night     Drug use: Never       PHYSICAL EXAM:    Vitals: BP (!) 150/71   Pulse 59   Temp 98.5  F (36.9  C) (Oral)   Resp 16   Wt 78 kg (172 lb)   SpO2 93%   BMI 26.94 kg/m     Constitutional: Uncomfortable appearing elderly male who is holding his epigastric abdomen  HEAD:Normocephalic, atraumatic,   Eyes: PERRLA, EOM intact, conjunctiva clear, no discharge  ENT: mucous membranes moist, nose normal.   Neck- Supple, gross ROM intact.  No JVD.  No palpable nodes.  Pulmonary: Clear to auscultation bilaterally, no respiratory distress, no wheezing, speaks full sentences easily.  Chest: No chest wall tenderness  Cardiovascular: Normal heart rate, regular rhythm, no murmurs. No lower extremity edema, 2+ DP pulses.   GI: Soft, not distended, no masses.  No hepatosplenomegaly. Tenderness to palpation of epigastric abdomen.  Musculoskeletal: Moving all 4 extremities intentionally and without pain. No obvious deformity.  Back: No CVA tenderness  Skin: Warm, dry, no rash.  Neurologic: Alert & oriented x 3, speech clear, moving all extremities spontaneously   Psychiatric: Affect normal, cooperative.     LAB:  All pertinent labs reviewed and interpreted.  Labs Ordered and Resulted from Time of ED Arrival to Time of ED Departure   COMPREHENSIVE METABOLIC PANEL - Abnormal       Result Value    Sodium 138      Potassium 3.4 (*)     Chloride 103      Carbon Dioxide (CO2) 25       Anion Gap 10      Urea Nitrogen 12      Creatinine 0.82      Calcium 9.1      Glucose 135 (*)     Alkaline Phosphatase 99       (*)      (*)     Protein Total 6.8      Albumin 3.9      Bilirubin Total 2.6 (*)     GFR Estimate 83     LIPASE - Abnormal    Lipase 75 (*)    CBC WITH PLATELETS AND DIFFERENTIAL - Abnormal    WBC Count 8.7      RBC Count 4.99      Hemoglobin 17.0      Hematocrit 46.9      MCV 94      MCH 34.1 (*)     MCHC 36.2      RDW 12.8      Platelet Count 193      % Neutrophils 84      % Lymphocytes 7      % Monocytes 8      % Eosinophils 1      % Basophils 0      % Immature Granulocytes 0      NRBCs per 100 WBC 0      Absolute Neutrophils 7.3      Absolute Lymphocytes 0.6 (*)     Absolute Monocytes 0.7      Absolute Eosinophils 0.1      Absolute Basophils 0.0      Absolute Immature Granulocytes 0.0      Absolute NRBCs 0.0     ROUTINE UA WITH MICROSCOPIC REFLEX TO CULTURE - Abnormal    Color Urine Yellow      Appearance Urine Clear      Glucose Urine Negative      Bilirubin Urine Negative      Ketones Urine Trace (*)     Specific Gravity Urine 1.017      Blood Urine 0.1 mg/dL (*)     pH Urine 6.5      Protein Albumin Urine 10 (*)     Urobilinogen Urine <2.0      Nitrite Urine Negative      Leukocyte Esterase Urine Negative      Mucus Urine Present (*)     RBC Urine 26 (*)     WBC Urine 1      Hyaline Casts Urine 3 (*)    TROPONIN I - Normal    Troponin I 0.02         RADIOLOGY:  US Abdomen Limited (RUQ)   Final Result   IMPRESSION:   1.  There is gallbladder wall thickening and a small amount of pericholecystic fluid which can be seen with acute cholecystitis. No sonographic Armirez sign.   2.  Probable small amount of sludge in the gallbladder. No definite gallstone.            CT Abdomen Pelvis w Contrast   Final Result   IMPRESSION:    1.  Cholelithiasis with gallbladder wall thickening. This may represent acute cholecystitis. Recommend a right upper quadrant ultrasound for further  characterization.   2.  Stable large hiatal hernia.          EKG:   Performed at: 4/3/23 1800  Impression: Undetermined rhythm, possible inferior infarct - age undetermined  Rate: 57 bpm  Rhythm: Undetermined  QRS Interval: 100 ms  QTc Interval: 447 ms  Comparison: When compared with ECG of 7/3/2019, current undetermined rhythm precludes rhythm comparison.  I have independently reviewed and interpreted the EKG(s) documented above.     PROCEDURES:   Procedures       I, Abraham Prasad, am serving as a scribe to document services personally performed by Dr. Milton Raphael based on my observation and the provider's statements to me. I, Milton Raphael M.D. attest that Abraham Prasad is acting in a scribe capacity, has observed my performance of the services and has documented them in accordance with my direction.      Milton Raphael M.D.  Emergency Medicine  [facility] EMERGENCY ROOM  [address] [address]  [phone]  Dept: [phone]      Milton Raphael MD  04/03/23 2341       Milton Raphael MD  04/03/23 0085

## 2023-04-04 NOTE — H&P
[facility]    History and Physical - Hospitalist Service       Date of Admission:  4/3/2023    Assessment & Plan      Blair Quiles is a 89 year old male admitted on 4/3/2023. He presented RUQ pain.    1.  Acute cholecystitis        Choledocholithiasis        Elevated LFTs    Pt presented with the ED with significant post-prandial epigastric pain that he improved with NPO status.  Imaging reviewed and revealed cholelithiasis with GB wall thickening and pericholecystic fluid.      He had elevated LFTs and elevated total bilirubin.  Pain has improved suggesting possible passage of a stone. Gen surgery consult requested and is pending. Will keep NPO and on IVF.  ? MRCP imaging.  Likely will need cholecystectomy at some point.      Continue IV Zosyn for now    2.  HTN    We resume norvasc and lisinopril with holding BP parameters.  Will hold PTA hydrochlorothiazide, for now.    3.  Hyperlipidemia    Hold statin for now d/t elevation in LFTs    4.  Daily alcohol use  Noted.  Will check magnesium    5.  Hypokalemia  Will supplement per protocol.  Check mag.   PTA meds do include potassium supplement daily    6.  Bradycardia  Not on b-blockers PTA.  Will supplement electrolytes.  Continue to monitor HR closely on tele monitoring.    7. H/o anxiety  Will resume home medications       Medical Decision Making   76 MIN SPENT BY ME on the date of service doing chart review, history, exam, documentation & further activities per the note.      Additional Labs/Imaging Reviewed:  See Data section below    Labs/Imaging Ordered:   Awaiting repeat hepatic panel from this am      Diet: NPO    DVT Prophylaxis: Pneumatic Compression Devices  [place] Catheter: Not present  Lines: None     Cardiac Monitoring: None  Code Status:  FULL - confirmed with him at the bedside with son    Disposition Plan  awaiting surgery consult     Expected Discharge Date: 04/05/2023                  Sally Hernandez,    Hospitalist Service  [facility]  Securely message with [employer] (more info)  Text page via [facility] Paging/Directory   ____________________________________________________________________    Chief Complaint   abd pain    History is obtained from the patient and patient's son present at bedside.    History of Present Illness   Blair Quiles is a 89 year old male who presented with sudden onset of mid-epigastric pain after eating lunch (egg salad and cheetos). Denies emesis or diarrhea; was nauseated from the severe pain.  No chest pain, cough, F/C, headache or other new symptoms.  Pain has resolved now and he is feeling thirsty.  No clear previous similar symptoms before.  He is worried about the pain returning.      No significant h/o CAD or concern over bradycardia, per pt and son.       Past Medical History    HTN  Hyperlipidemia  SBO d/t incarcerated hernia  Traumatic fall with rib fracture and punctured lung ([place] 11/2019)  Anxiety    Past Surgical History   Past Surgical History:   Procedure Laterality Date     INGUINAL HERNIA REPAIR Right 7/3/2019    Procedure: REPAIR, HERNIA, INGUINAL, OPEN;  Surgeon: Sabina Dominguez DO;  [facility];  Service: General     PROSTATECTOMY         Prior to Admission Medications   Prior to Admission Medications   Prescriptions Last Dose Informant Patient Reported? Taking?   LORazepam (ATIVAN) 0.5 MG tablet Unknown  Yes Yes   Sig: [LORAZEPAM (ATIVAN) 0.5 MG TABLET] Take 0.5 mg by mouth daily as needed for anxiety.   amLODIPine (NORVASC) 5 MG tablet 4/3/2023  Yes Yes   Sig: [AMLODIPINE (NORVASC) 5 MG TABLET] Take 5 mg by mouth daily.   aspirin 81 MG EC tablet 4/3/2023  Yes Yes   Sig: [ASPIRIN 81 MG EC TABLET] Take 81 mg by mouth daily.   atorvastatin (LIPITOR) 20 MG tablet 4/2/2023 at hs  Yes Yes   Sig: [ATORVASTATIN (LIPITOR) 20 MG TABLET] Take 20 mg by mouth at bedtime.   cholecalciferol (VITAMIN D3) 25 mcg (1000 units) capsule  4/3/2023  Yes Yes   Sig: Take 1 capsule by mouth daily   cyanocobalamin (VITAMIN B-12) 100 MCG tablet 4/3/2023  Yes Yes   Sig: Take 100 mcg by mouth daily   ferrous sulfate (FEROSUL) 325 (65 Fe) MG tablet 4/3/2023  Yes Yes   Sig: Take 325 mg by mouth daily (with breakfast)   lisinopril-hydrochlorothiazide (PRINZIDE,ZESTORETIC) 20-12.5 mg per tablet 4/3/2023  Yes Yes   Sig: [LISINOPRIL-HYDROCHLOROTHIAZIDE (PRINZIDE,ZESTORETIC) 20-12.5 MG PER TABLET] Take 1 tablet by mouth daily.   mirtazapine (REMERON) 30 MG tablet 4/2/2023  Yes Yes   Sig: [MIRTAZAPINE (REMERON) 30 MG TABLET] Take 30 mg by mouth at bedtime.   multivitamin therapeutic tablet 4/3/2023  Yes Yes   Sig: [MULTIVITAMIN THERAPEUTIC TABLET] Take 1 tablet by mouth daily.   potassium chloride (K-DUR,KLOR-CON) 20 MEQ tablet 4/3/2023  Yes Yes   Sig: [POTASSIUM CHLORIDE (K-DUR,KLOR-CON) 20 MEQ TABLET] Take 20 mEq by mouth daily.   traZODone (DESYREL) 50 MG tablet 4/2/2023  Yes Yes   Sig: [TRAZODONE (DESYREL) 50 MG TABLET] Take 150 mg by mouth at bedtime.      Facility-Administered Medications: None      Allergies   No Known Allergies      Review of Systems    The 10 point Review of Systems is negative other than noted in the HPI or here.     Social History   I have reviewed this patient's social history and updated it with pertinent information if needed.  Social History     Tobacco Use     Smoking status: Never     Smokeless tobacco: Never   Substance Use Topics     Alcohol use: Yes     Alcohol/week: 14.0 standard drinks of alcohol     Comment: Alcoholic Drinks/day: 2 glasses of wine / night     Drug use: Never         Physical Exam   Vital Signs: Temp: 98.5  F (36.9  C) Temp src: Oral BP: (!) 140/64 Pulse: 52   Resp: 18 SpO2: 92 % O2 Device: Nasal cannula Oxygen Delivery: 1 LPM  Weight: 172 lbs 0 oz    GEN:  Alert, oriented x 3, appears comfortable, NAD. Eldery male who appears slightly younger than his stated age  HEENT:  Normocephalic/atraumatic, no scleral  icterus, no nasal discharge, mouth covered with mask  CV:  Regular rate and rhythm, no loud murmur or JVD.  S1 + S2 noted, no S3 or S4.  LUNGS:  Clear to auscultation bilaterally without rales/rhonchi/wheezing/retractions.  Symmetric chest rise on inhalation noted.  ABD:  Active bowel sounds, soft, non-tender to palpation throughout the abd today.  Min distended.  No clear rebound/guarding/rigidity.  EXT:  No significant pretibial edema.  No cyanosis visualized    SKIN:  Dry to touch, no exanthems noted in the visualized areas.  NEURO: Coordination symmetric on general exam.  No new focal deficits appreciated.  CN 2-12 grossly intact to testing bilaterally.    Data   Labs and Imaging results below reviewed today.    EKG - 4/3/23 - undetermined rhythm, georges rhythm no clear ST wave changes or T wave abnormalities     Recent Labs   Lab 04/03/23  1805   WBC 8.7   HGB 17.0   HCT 46.9   MCV 94        Recent Labs   Lab 04/03/23  1805   WBC 8.7   HGB 17.0   HCT 46.9   MCV 94        Recent Labs   Lab 04/04/23  0854 04/03/23  1805    138   POTASSIUM 3.2* 3.4*   CHLORIDE 106 103   CO2 25 25   ANIONGAP 8 10   GLC 91 135*   BUN 9 12   CR 0.77 0.82   GFRESTIMATED 85 83   LUCAS 8.2* 9.1     7-Day Micro Results     No results found for the last 168 hours.        Recent Labs   Lab 04/04/23  0854 04/03/23  1805    138   POTASSIUM 3.2* 3.4*   CHLORIDE 106 103   CO2 25 25   ANIONGAP 8 10   GLC 91 135*   BUN 9 12   CR 0.77 0.82   GFRESTIMATED 85 83   LUCAS 8.2* 9.1   PROTTOTAL 5.7* 6.8   ALBUMIN 3.2* 3.9   BILITOTAL 1.6* 2.6*   ALKPHOS 102 99   * 156*   * 109*     Recent Labs   Lab 04/04/23  0854 04/03/23  1805   * 156*   * 109*   ALKPHOS 102 99   BILITOTAL 1.6* 2.6*     No results for input(s): INR in the last 168 hours.  No results for input(s): LACT in the last 168 hours.  No results for input(s): TROPONIN, TROPI, TROPR, TROPONINIS in the last 168 hours.    Invalid input(s): TROPT,  TROP, TROPONINIES, TNIH  Recent Results (from the past 24 hour(s))   CT Abdomen Pelvis w Contrast    Narrative    EXAM: CT ABDOMEN PELVIS W CONTRAST  LOCATION: [facility]  DATE/TIME: 4/3/2023 9:02 PM    INDICATION: severe epigastric abd pain after lunch today. eval for aaa, raj, cause of pain  COMPARISON: 07/03/2019  TECHNIQUE: CT scan of the abdomen and pelvis was performed following injection of IV contrast. Multiplanar reformats were obtained. Dose reduction techniques were used.  CONTRAST: 100ml Isovue 370    FINDINGS:   LOWER CHEST: Stable large hiatal hernia with the majority of the stomach in the thoracic cavity. Mild bibasilar fibrosis.    HEPATOBILIARY: Distended gallbladder with the wall measuring up to 0.5 cm. Cholelithiasis. No extrahepatic biliary ductal dilatation. Stable mild intrahepatic biliary ductal dilatation in the left lobe.    PANCREAS: Stable pancreatic ductal dilatation at 0.4 cm. No mass.    SPLEEN: Normal.    ADRENAL GLANDS: Normal.    KIDNEYS/BLADDER: Simple renal cysts, no follow-up required. No hydronephrosis.    BOWEL: Diverticulosis of the colon. No acute inflammatory change. No obstruction. Normal appendix.    LYMPH NODES: Normal.    VASCULATURE: Moderate atherosclerosis. No aneurysm.    PELVIC ORGANS: Prostatectomy.    MUSCULOSKELETAL: Small fat-containing left inguinal hernia. Remote right rib fractures. Degenerative changes. Osseous demineralization.      Impression    IMPRESSION:   1.  Cholelithiasis with gallbladder wall thickening. This may represent acute cholecystitis. Recommend a right upper quadrant ultrasound for further characterization.  2.  Stable large hiatal hernia.   US Abdomen Limited (RUQ)    Narrative    EXAM: US ABDOMEN LIMITED  LOCATION: [facility]  DATE/TIME: 4/3/2023 11:10 PM    INDICATION: epigastric pain after lunch. gall stones and wall thick on ct.  COMPARISON: CT 04/03/2023.  TECHNIQUE: Limited  abdominal ultrasound.    FINDINGS:    GALLBLADDER: There is a probable small amount of sludge in the gallbladder. The gallbladder wall is thickened to 5 mm. Small amount of pericholecystic fluid. No sonographic Ramirez sign.    BILE DUCTS: No biliary dilatation. The common duct measures 5 mm.    LIVER: Normal parenchyma with smooth contour. No focal mass. The left lobe was not well seen.    RIGHT KIDNEY: No hydronephrosis. 7.5 cm cyst at the lower pole.    PANCREAS: Obscured by bowel gas.    No ascites.      Impression    IMPRESSION:  1.  There is gallbladder wall thickening and a small amount of pericholecystic fluid which can be seen with acute cholecystitis. No sonographic Ramirez sign.  2.  Probable small amount of sludge in the gallbladder. No definite gallstone.                    You can access the FollowMyHealth Patient Portal offered by [facility] by registering at the following website: [email or web address]. By joining [employer]’s FollowMyHealth portal, you will also be able to view your health information using other applications (apps) compatible with our system.

## 2023-04-04 NOTE — ED NOTES
Son reporting to writer that pain is getting worse.  Waiting in lobby at this time.  Made aware that MD would need to place an order and could check on that for him.

## 2023-04-04 NOTE — PHARMACY-ADMISSION MEDICATION HISTORY
Pharmacist Admission Medication History    Admission medication history is complete. The information provided in this note is only as accurate as the sources available at the time of the update.    Medication reconciliation/reorder completed by provider prior to medication history? No    Information Source(s): Patient and CareEverywhere/SureScripts via in-person    Pertinent Information: -    Changes made to PTA medication list:    Added: B12, Iron, D3    Deleted: None    Changed: None    Medication Affordability:       Allergies reviewed with patient and updates made in EHR: yes    Medication History Completed By: Nicci Hernandez [facility] 4/3/2023 10:38 PM    PTA Med List   Medication Sig Note Last Dose     amLODIPine (NORVASC) 5 MG tablet [AMLODIPINE (NORVASC) 5 MG TABLET] Take 5 mg by mouth daily.  4/3/2023     aspirin 81 MG EC tablet [ASPIRIN 81 MG EC TABLET] Take 81 mg by mouth daily.  4/3/2023     atorvastatin (LIPITOR) 20 MG tablet [ATORVASTATIN (LIPITOR) 20 MG TABLET] Take 20 mg by mouth at bedtime.  4/2/2023 at hs     cholecalciferol (VITAMIN D3) 25 mcg (1000 units) capsule Take 1 capsule by mouth daily  4/3/2023     cyanocobalamin (VITAMIN B-12) 100 MCG tablet Take 100 mcg by mouth daily  4/3/2023     ferrous sulfate (FEROSUL) 325 (65 Fe) MG tablet Take 325 mg by mouth daily (with breakfast)  4/3/2023     lisinopril-hydrochlorothiazide (PRINZIDE,ZESTORETIC) 20-12.5 mg per tablet [LISINOPRIL-HYDROCHLOROTHIAZIDE (PRINZIDE,ZESTORETIC) 20-12.5 MG PER TABLET] Take 1 tablet by mouth daily.  4/3/2023     LORazepam (ATIVAN) 0.5 MG tablet [LORAZEPAM (ATIVAN) 0.5 MG TABLET] Take 0.5 mg by mouth daily as needed for anxiety. 4/3/2023: No fills through insurance in past year Unknown     mirtazapine (REMERON) 30 MG tablet [MIRTAZAPINE (REMERON) 30 MG TABLET] Take 30 mg by mouth at bedtime.  4/2/2023     multivitamin therapeutic tablet [MULTIVITAMIN THERAPEUTIC TABLET] Take 1 tablet by mouth daily.  4/3/2023     potassium  chloride (K-DUR,KLOR-CON) 20 MEQ tablet [POTASSIUM CHLORIDE (K-DUR,KLOR-CON) 20 MEQ TABLET] Take 20 mEq by mouth daily.  4/3/2023     traZODone (DESYREL) 50 MG tablet [TRAZODONE (DESYREL) 50 MG TABLET] Take 150 mg by mouth at bedtime.  4/2/2023

## 2023-04-04 NOTE — CONSULTS
Care Management Initial Consult    General Information  Assessment completed with: Patient,    Type of CM/SW Visit: Initial Assessment    Primary Care Provider verified and updated as needed:     Readmission within the last 30 days: no previous admission in last 30 days      Reason for Consult: discharge planning  Advance Care Planning: Advance Care Planning Reviewed: no concerns identified     General Information Comments: Lives with wife    Communication Assessment  Patient's communication style: spoken language (English or Bilingual)    Hearing Difficulty or Deaf: no   Wear Glasses or Blind: yes    Cognitive  Cognitive/Neuro/Behavioral: WDL                      Living Environment:   People in home: spouse     Current living Arrangements: independent living facility      Able to return to prior arrangements: yes  Living Arrangement Comments: Lives at [facility]    Family/Social Support:  Care provided by: self  Provides care for: no one  Marital Status:   Wife, Children  Suyapa       Description of Support System: Supportive, Involved    Support Assessment: Adequate family and caregiver support    Current Resources:   Patient receiving home care services: No     Community Resources: None  Equipment currently used at home: cane, straight  Supplies currently used at home: None    Employment/Financial:  Employment Status: retired        Financial Concerns: No concerns identified         Lifestyle & Psychosocial Needs:  Social Determinants of Health     Tobacco Use: Low Risk  (4/4/2023)    Patient History      Smoking Tobacco Use: Never      Smokeless Tobacco Use: Never      Passive Exposure: Not on file   Alcohol Use: Not on file   Financial Resource Strain: Not on file   Food Insecurity: Not on file   Transportation Needs: Not on file   Physical Activity: Not on file   Stress: Not on file   Social Connections: Not on file   Intimate Partner Violence: Not on file   Depression: Not on  file   Housing Stability: Not on file       Functional Status:  Prior to admission patient needed assistance:   Dependent ADLs:: Independent, Ambulation-cane  Dependent IADLs:: Independent  Assesssment of Functional Status: Not at  functional baseline    Mental Health Status:  Mental Health Status: No Current Concerns       Chemical Dependency Status:  Chemical Dependency Status: No Current Concerns             Values/Beliefs:  Spiritual, Cultural Beliefs, Mosque Practices, Values that affect care:                 Additional Information:   Patient brought to the ER for abdominal pain that started around noon today after he ate lunch.  He says the pain is severe and rated 10 out of 10 on my exam.  The pain is in his epigastric abdomen and does not radiate anywhere.  He has not noticed anything that makes it better or worse.  On exam he was tender to palpation of epigastric abdomen. The scan showed signs of cholecystitis but ultrasound was recommended.  An ultrasound was then done and confirmed the cholecystitis with thickening of the gallbladder wall up to 5 mm.  I discussed patient's case with general surgery, Dr. Banda.  I have admitted the patient to the hospitalist service for a pre-op exam .     Reports he lives at [facility] with his wife Suyapa. States independence with I/ADLs. Has a cane he uses occasionally, no home care services; does drive. Explained SAHA/Observation Status and patient verbalized understanding. Plan is for patient to return home with family transport patient on discharge. Other needs pending clinical progress.      ADIEL LYNN, EVON/CM

## 2023-04-04 NOTE — CONSULTS
General Surgery Consultation  Blair Quiles MRN# [id number]   Age/Sex: 89 year old male YOB: 1931     Reason for consult: 1. Acute cholecystitis    2. Hypokalemia            Requesting physician: Dr. Hernandez                   Assessment and Plan:   Assessment:  Cholelithiasis  Choledocholithiasis vs passed stone    Plan:  Reviewed exam, lab, and imaging findings with them. Findings consistent with cholelithiasis and question of either passed stone or choledocholithiasis. Labs today support passed stone. Dr. Dietz ordered MRCP. GI consulted. Likely needs to get a cholecystectomy, but will await further work up and GI input. Surgery would be tomorrow at the earliest, so once MRCP is done, he can likely ADAT and be NPO at midnight.        William Alva PA-C  [facility]  Surgery Clinic [address]  [address]  [place] [address]?  Office: [phone]             Chief Complaint:     Chief Complaint   Patient presents with     Abdominal Pain        History is obtained from the patient    HPI:   Blair Quiles is a 89 year old male who presents with sudden onset epigastric pain yesterday after lunch. He presented to the ED and US showed gallstones and LFTs were elevated. His is basically pain free this AM. He has had similar, but more mild, episodes in the last 6 months. He denies any fevers, chills, or n/v.    Previous right IHR.          Past Medical History:   History reviewed. No pertinent past medical history.           Past Surgical History:     Past Surgical History:   Procedure Laterality Date     INGUINAL HERNIA REPAIR Right 7/3/2019    Procedure: REPAIR, HERNIA, INGUINAL, OPEN;  Surgeon: Sabina Dominguez DO;  Location: [facility];  Service: General     PROSTATECTOMY               Social History:    reports that he has never smoked. He has never used smokeless tobacco. He reports current alcohol use of about 14.0 standard drinks of alcohol  per week. He reports that he does not use drugs.           Family History:   History reviewed. No pertinent family history.           Allergies:   No Known Allergies           Medications:     Prior to Admission medications    Medication Sig Start Date End Date Taking? Authorizing Provider   amLODIPine (NORVASC) 5 MG tablet [AMLODIPINE (NORVASC) 5 MG TABLET] Take 5 mg by mouth daily. 7/3/19  Yes Provider, Historical   aspirin 81 MG EC tablet [ASPIRIN 81 MG EC TABLET] Take 81 mg by mouth daily. 7/3/19  Yes Provider, Historical   atorvastatin (LIPITOR) 20 MG tablet [ATORVASTATIN (LIPITOR) 20 MG TABLET] Take 20 mg by mouth at bedtime. 7/3/19  Yes Provider, Historical   cholecalciferol (VITAMIN D3) 25 mcg (1000 units) capsule Take 1 capsule by mouth daily   Yes Unknown, Entered By History   cyanocobalamin (VITAMIN B-12) 100 MCG tablet Take 100 mcg by mouth daily   Yes Unknown, Entered By History   ferrous sulfate (FEROSUL) 325 (65 Fe) MG tablet Take 325 mg by mouth daily (with breakfast)   Yes Unknown, Entered By History   lisinopril-hydrochlorothiazide (PRINZIDE,ZESTORETIC) 20-12.5 mg per tablet [LISINOPRIL-HYDROCHLOROTHIAZIDE (PRINZIDE,ZESTORETIC) 20-12.5 MG PER TABLET] Take 1 tablet by mouth daily. 7/3/19  Yes Provider, Historical   LORazepam (ATIVAN) 0.5 MG tablet [LORAZEPAM (ATIVAN) 0.5 MG TABLET] Take 0.5 mg by mouth daily as needed for anxiety. 7/3/19  Yes Provider, Historical   mirtazapine (REMERON) 30 MG tablet [MIRTAZAPINE (REMERON) 30 MG TABLET] Take 30 mg by mouth at bedtime. 7/3/19  Yes Provider, Historical   multivitamin therapeutic tablet [MULTIVITAMIN THERAPEUTIC TABLET] Take 1 tablet by mouth daily. 7/3/19  Yes Provider, Historical   potassium chloride (K-DUR,KLOR-CON) 20 MEQ tablet [POTASSIUM CHLORIDE (K-DUR,KLOR-CON) 20 MEQ TABLET] Take 20 mEq by mouth daily. 7/3/19  Yes Provider, Historical   traZODone (DESYREL) 50 MG tablet [TRAZODONE (DESYREL) 50 MG TABLET] Take 150 mg by mouth at bedtime. 7/3/19   Yes Provider, Historical              Review of Systems:   The Review of Systems is negative other than noted in the HPI            Physical Exam:     Patient Vitals for the past 24 hrs:   BP Temp Temp src Pulse Resp SpO2 Weight   04/04/23 0645 -- -- -- 52 -- 92 % --   04/04/23 0600 (!) 140/64 -- -- (!) 46 18 93 % --   04/04/23 0543 (!) 144/74 -- -- 51 18 95 % --   04/04/23 0430 -- -- -- (!) 48 -- 95 % --   04/04/23 0145 127/65 -- -- (!) 47 -- 96 % --   04/04/23 0142 -- -- -- -- -- 96 % --   04/04/23 0139 127/65 -- -- 50 17 92 % --   04/03/23 2205 -- -- -- 59 -- 93 % --   04/03/23 2200 -- -- -- 60 -- 94 % --   04/03/23 2155 -- -- -- 61 -- 93 % --   04/03/23 2130 -- -- -- 64 -- 94 % --   04/03/23 2030 -- -- -- 65 -- 95 % --   04/03/23 2025 -- -- -- 68 -- 96 % --   04/03/23 2020 -- -- -- 72 -- 93 % --   04/03/23 2015 -- -- -- 70 -- 93 % --   04/03/23 2010 -- -- -- 69 -- 94 % --   04/03/23 2005 -- -- -- 71 -- 92 % --   04/03/23 2000 (!) 150/71 -- -- 70 16 96 % --   04/03/23 1753 (!) 157/85 98.5  F (36.9  C) Oral 65 16 95 % 78 kg (172 lb)        No intake or output data in the 24 hours ending 04/04/23 1001   Constitutional:   awake, alert, cooperative, no apparent distress, and appears stated age       Eyes:   PERRL, conjunctiva/corneas clear, EOM's intact; no scleral edema or icterus noted        ENT:   Normocephalic, without obvious abnormality, atraumatic, Lips, mucosa, and tongue normal        Hematologic / Lymphatic:   No lymphadenopathy       Lungs:   Normal respiratory effort, no accessory muscle use       Cardiovascular:   Regular rate and rhythm       Abdomen:   Soft, nondistended, nontender       Musculoskeletal:   No obvious swelling, bruising or deformity       Skin:   Skin color and texture normal for patient, no rashes or lesions              Data:        Admission on 04/03/2023   Component Date Value     Ventricular Rate 04/03/2023 57      Atrial Rate 04/03/2023 69      QRS Duration 04/03/2023 100      QT  04/03/2023 460      QTc 04/03/2023 447      P Axis 04/03/2023 41      R AXIS 04/03/2023 -8      T [place] 04/03/2023 -38      Interpretation ECG 04/03/2023                      Value:Undetermined rhythm  Possible Inferior infarct , age undetermined  Abnormal ECG  When compared with ECG of 03-JUL-2019 04:11,  Current undetermined rhythm precludes rhythm comparison, needs review  Confirmed by SEE ED PROVIDER NOTE FOR, ECG INTERPRETATION ([id number]),  Susan Garcia ([id number]) on 4/4/2023 12:59:13 AM       Hold Specimen 04/03/2023 JIC      Hold Specimen 04/03/2023 JIC      Hold Specimen 04/03/2023 JIC      Hold Specimen 04/03/2023 JIC      Troponin I 04/03/2023 0.02      Sodium 04/03/2023 138      Potassium 04/03/2023 3.4 (L)      Chloride 04/03/2023 103      Carbon Dioxide (CO2) 04/03/2023 25      Anion Gap 04/03/2023 10      Urea Nitrogen 04/03/2023 12      Creatinine 04/03/2023 0.82      Calcium 04/03/2023 9.1      Glucose 04/03/2023 135 (H)      Alkaline Phosphatase 04/03/2023 99      AST 04/03/2023 156 (H)      ALT 04/03/2023 109 (H)      Protein Total 04/03/2023 6.8      Albumin 04/03/2023 3.9      Bilirubin Total 04/03/2023 2.6 (H)      GFR Estimate 04/03/2023 83      Lipase 04/03/2023 75 (H)      WBC Count 04/03/2023 8.7      RBC Count 04/03/2023 4.99      Hemoglobin 04/03/2023 17.0      Hematocrit 04/03/2023 46.9      MCV 04/03/2023 94      MCH 04/03/2023 34.1 (H)      MCHC 04/03/2023 36.2      RDW 04/03/2023 12.8      Platelet Count 04/03/2023 193      % Neutrophils 04/03/2023 84      % Lymphocytes 04/03/2023 7      % Monocytes 04/03/2023 8      % Eosinophils 04/03/2023 1      % Basophils 04/03/2023 0      % Immature Granulocytes 04/03/2023 0      NRBCs per 100 WBC 04/03/2023 0      Absolute Neutrophils 04/03/2023 7.3      Absolute Lymphocytes 04/03/2023 0.6 (L)      Absolute Monocytes 04/03/2023 0.7      Absolute Eosinophils 04/03/2023 0.1      Absolute Basophils 04/03/2023 0.0      Absolute Immature Granul*  04/03/2023 0.0      Absolute NRBCs 04/03/2023 0.0      Color Urine 04/03/2023 Yellow      Appearance Urine 04/03/2023 Clear      Glucose Urine 04/03/2023 Negative      Bilirubin Urine 04/03/2023 Negative      Ketones Urine 04/03/2023 Trace (A)      Specific Gravity Urine 04/03/2023 1.017      Blood Urine 04/03/2023 0.1 mg/dL (A)      pH Urine 04/03/2023 6.5      Protein Albumin Urine 04/03/2023 10 (A)      Urobilinogen Urine 04/03/2023 <2.0      Nitrite Urine 04/03/2023 Negative      Leukocyte Esterase Urine 04/03/2023 Negative      Mucus Urine 04/03/2023 Present (A)      RBC Urine 04/03/2023 26 (H)      WBC Urine 04/03/2023 1      Hyaline Casts Urine 04/03/2023 3 (H)      Bilirubin Total 04/04/2023 1.6 (H)      Bilirubin Direct 04/04/2023 0.6 (H)      Protein Total 04/04/2023 5.7 (L)      Albumin 04/04/2023 3.2 (L)      Alkaline Phosphatase 04/04/2023 102      AST 04/04/2023 120 (H)      ALT 04/04/2023 171 (H)      Sodium 04/04/2023 139      Potassium 04/04/2023 3.2 (L)      Chloride 04/04/2023 106      Carbon Dioxide (CO2) 04/04/2023 25      Anion Gap 04/04/2023 8      Urea Nitrogen 04/04/2023 9      Creatinine 04/04/2023 0.77      Calcium 04/04/2023 8.2 (L)      Glucose 04/04/2023 91      GFR Estimate 04/04/2023 85          All imaging studies reviewed by me.

## 2023-04-05 ENCOUNTER — ANESTHESIA EVENT (OUTPATIENT)
Dept: SURGERY | Facility: CLINIC | Age: 88
DRG: 418 | End: 2023-04-05
Payer: MEDICARE

## 2023-04-05 ENCOUNTER — ANESTHESIA (OUTPATIENT)
Dept: SURGERY | Facility: CLINIC | Age: 88
DRG: 418 | End: 2023-04-05
Payer: MEDICARE

## 2023-04-05 LAB
ALBUMIN SERPL-MCNC: 3.4 G/DL (ref 3.5–5)
ALP SERPL-CCNC: 91 U/L (ref 45–120)
ALT SERPL W P-5'-P-CCNC: 117 U/L (ref 0–45)
ANION GAP SERPL CALCULATED.3IONS-SCNC: 9 MMOL/L (ref 5–18)
AST SERPL W P-5'-P-CCNC: 54 U/L (ref 0–40)
BILIRUB SERPL-MCNC: 1.4 MG/DL (ref 0–1)
BUN SERPL-MCNC: 7 MG/DL (ref 8–28)
CALCIUM SERPL-MCNC: 8.2 MG/DL (ref 8.5–10.5)
CHLORIDE BLD-SCNC: 107 MMOL/L (ref 98–107)
CO2 SERPL-SCNC: 25 MMOL/L (ref 22–31)
CREAT SERPL-MCNC: 0.8 MG/DL (ref 0.7–1.3)
ERYTHROCYTE [DISTWIDTH] IN BLOOD BY AUTOMATED COUNT: 13 % (ref 10–15)
GFR SERPL CREATININE-BSD FRML MDRD: 84 ML/MIN/1.73M2
GLUCOSE BLD-MCNC: 93 MG/DL (ref 70–125)
GLUCOSE BLDC GLUCOMTR-MCNC: 107 MG/DL (ref 70–99)
GLUCOSE BLDC GLUCOMTR-MCNC: 96 MG/DL (ref 70–99)
HCT VFR BLD AUTO: 45 % (ref 40–53)
HGB BLD-MCNC: 15.6 G/DL (ref 13.3–17.7)
INR PPP: 1.12 (ref 0.85–1.15)
MAGNESIUM SERPL-MCNC: 1.9 MG/DL (ref 1.8–2.6)
MCH RBC QN AUTO: 33.9 PG (ref 26.5–33)
MCHC RBC AUTO-ENTMCNC: 34.7 G/DL (ref 31.5–36.5)
MCV RBC AUTO: 98 FL (ref 78–100)
PLATELET # BLD AUTO: 166 10E3/UL (ref 150–450)
PLATELET # BLD AUTO: 171 10E3/UL (ref 150–450)
POTASSIUM BLD-SCNC: 3.5 MMOL/L (ref 3.5–5)
PROT SERPL-MCNC: 5.9 G/DL (ref 6–8)
RBC # BLD AUTO: 4.6 10E6/UL (ref 4.4–5.9)
SODIUM SERPL-SCNC: 141 MMOL/L (ref 136–145)
WBC # BLD AUTO: 4.9 10E3/UL (ref 4–11)

## 2023-04-05 PROCEDURE — 96361 HYDRATE IV INFUSION ADD-ON: CPT

## 2023-04-05 PROCEDURE — 250N000011 HC RX IP 250 OP 636: Performed by: NURSE ANESTHETIST, CERTIFIED REGISTERED

## 2023-04-05 PROCEDURE — 272N000001 HC OR GENERAL SUPPLY STERILE: Performed by: SURGERY

## 2023-04-05 PROCEDURE — 250N000009 HC RX 250: Performed by: SURGERY

## 2023-04-05 PROCEDURE — 999N000141 HC STATISTIC PRE-PROCEDURE NURSING ASSESSMENT: Performed by: SURGERY

## 2023-04-05 PROCEDURE — 99207 PR NO BILLABLE SERVICE THIS VISIT: CPT | Performed by: INTERNAL MEDICINE

## 2023-04-05 PROCEDURE — 258N000003 HC RX IP 258 OP 636: Performed by: NURSE ANESTHETIST, CERTIFIED REGISTERED

## 2023-04-05 PROCEDURE — 88304 TISSUE EXAM BY PATHOLOGIST: CPT | Mod: TC | Performed by: SURGERY

## 2023-04-05 PROCEDURE — 250N000009 HC RX 250: Performed by: ANESTHESIOLOGY

## 2023-04-05 PROCEDURE — 85027 COMPLETE CBC AUTOMATED: CPT | Performed by: INTERNAL MEDICINE

## 2023-04-05 PROCEDURE — 250N000013 HC RX MED GY IP 250 OP 250 PS 637: Performed by: INTERNAL MEDICINE

## 2023-04-05 PROCEDURE — 210N000002 HC R&B HEART CARE

## 2023-04-05 PROCEDURE — 370N000017 HC ANESTHESIA TECHNICAL FEE, PER MIN: Performed by: SURGERY

## 2023-04-05 PROCEDURE — 250N000009 HC RX 250: Performed by: NURSE ANESTHETIST, CERTIFIED REGISTERED

## 2023-04-05 PROCEDURE — 258N000003 HC RX IP 258 OP 636: Performed by: ANESTHESIOLOGY

## 2023-04-05 PROCEDURE — G0378 HOSPITAL OBSERVATION PER HR: HCPCS

## 2023-04-05 PROCEDURE — 250N000011 HC RX IP 250 OP 636: Performed by: INTERNAL MEDICINE

## 2023-04-05 PROCEDURE — 250N000013 HC RX MED GY IP 250 OP 250 PS 637: Performed by: SURGERY

## 2023-04-05 PROCEDURE — 250N000011 HC RX IP 250 OP 636: Performed by: SURGERY

## 2023-04-05 PROCEDURE — 250N000025 HC SEVOFLURANE, PER MIN: Performed by: SURGERY

## 2023-04-05 PROCEDURE — 36415 COLL VENOUS BLD VENIPUNCTURE: CPT | Performed by: SURGERY

## 2023-04-05 PROCEDURE — 360N000076 HC SURGERY LEVEL 3, PER MIN: Performed by: SURGERY

## 2023-04-05 PROCEDURE — 250N000013 HC RX MED GY IP 250 OP 250 PS 637: Performed by: ANESTHESIOLOGY

## 2023-04-05 PROCEDURE — 710N000010 HC RECOVERY PHASE 1, LEVEL 2, PER MIN: Performed by: SURGERY

## 2023-04-05 PROCEDURE — 85049 AUTOMATED PLATELET COUNT: CPT | Performed by: SURGERY

## 2023-04-05 PROCEDURE — 80053 COMPREHEN METABOLIC PANEL: CPT | Performed by: INTERNAL MEDICINE

## 2023-04-05 PROCEDURE — 250N000011 HC RX IP 250 OP 636: Performed by: ANESTHESIOLOGY

## 2023-04-05 PROCEDURE — 36415 COLL VENOUS BLD VENIPUNCTURE: CPT | Performed by: INTERNAL MEDICINE

## 2023-04-05 PROCEDURE — 0FT44ZZ RESECTION OF GALLBLADDER, PERCUTANEOUS ENDOSCOPIC APPROACH: ICD-10-PCS | Performed by: SURGERY

## 2023-04-05 PROCEDURE — 85610 PROTHROMBIN TIME: CPT | Performed by: INTERNAL MEDICINE

## 2023-04-05 PROCEDURE — 96376 TX/PRO/DX INJ SAME DRUG ADON: CPT

## 2023-04-05 PROCEDURE — 83735 ASSAY OF MAGNESIUM: CPT | Performed by: INTERNAL MEDICINE

## 2023-04-05 PROCEDURE — 47562 LAPAROSCOPIC CHOLECYSTECTOMY: CPT | Performed by: SURGERY

## 2023-04-05 PROCEDURE — 82962 GLUCOSE BLOOD TEST: CPT

## 2023-04-05 RX ORDER — HYDROMORPHONE HCL IN WATER/PF 6 MG/30 ML
0.2 PATIENT CONTROLLED ANALGESIA SYRINGE INTRAVENOUS EVERY 5 MIN PRN
Status: DISCONTINUED | OUTPATIENT
Start: 2023-04-05 | End: 2023-04-05

## 2023-04-05 RX ORDER — ONDANSETRON 4 MG/1
4 TABLET, ORALLY DISINTEGRATING ORAL EVERY 30 MIN PRN
Status: DISCONTINUED | OUTPATIENT
Start: 2023-04-05 | End: 2023-04-05

## 2023-04-05 RX ORDER — DEXAMETHASONE SODIUM PHOSPHATE 4 MG/ML
INJECTION, SOLUTION INTRA-ARTICULAR; INTRALESIONAL; INTRAMUSCULAR; INTRAVENOUS; SOFT TISSUE PRN
Status: DISCONTINUED | OUTPATIENT
Start: 2023-04-05 | End: 2023-04-05

## 2023-04-05 RX ORDER — SODIUM CHLORIDE, SODIUM LACTATE, POTASSIUM CHLORIDE, CALCIUM CHLORIDE 600; 310; 30; 20 MG/100ML; MG/100ML; MG/100ML; MG/100ML
INJECTION, SOLUTION INTRAVENOUS CONTINUOUS
Status: DISCONTINUED | OUTPATIENT
Start: 2023-04-05 | End: 2023-04-05 | Stop reason: HOSPADM

## 2023-04-05 RX ORDER — NALOXONE HYDROCHLORIDE 0.4 MG/ML
0.2 INJECTION, SOLUTION INTRAMUSCULAR; INTRAVENOUS; SUBCUTANEOUS
Status: DISCONTINUED | OUTPATIENT
Start: 2023-04-05 | End: 2023-04-06 | Stop reason: HOSPADM

## 2023-04-05 RX ORDER — LIDOCAINE HYDROCHLORIDE AND EPINEPHRINE 10; 10 MG/ML; UG/ML
INJECTION, SOLUTION INFILTRATION; PERINEURAL PRN
Status: DISCONTINUED | OUTPATIENT
Start: 2023-04-05 | End: 2023-04-05 | Stop reason: HOSPADM

## 2023-04-05 RX ORDER — NALOXONE HYDROCHLORIDE 0.4 MG/ML
0.4 INJECTION, SOLUTION INTRAMUSCULAR; INTRAVENOUS; SUBCUTANEOUS
Status: DISCONTINUED | OUTPATIENT
Start: 2023-04-05 | End: 2023-04-06 | Stop reason: HOSPADM

## 2023-04-05 RX ORDER — MAGNESIUM HYDROXIDE 1200 MG/15ML
LIQUID ORAL PRN
Status: DISCONTINUED | OUTPATIENT
Start: 2023-04-05 | End: 2023-04-05 | Stop reason: HOSPADM

## 2023-04-05 RX ORDER — HYDROMORPHONE HCL IN WATER/PF 6 MG/30 ML
0.4 PATIENT CONTROLLED ANALGESIA SYRINGE INTRAVENOUS EVERY 5 MIN PRN
Status: DISCONTINUED | OUTPATIENT
Start: 2023-04-05 | End: 2023-04-05

## 2023-04-05 RX ORDER — ONDANSETRON 2 MG/ML
4 INJECTION INTRAMUSCULAR; INTRAVENOUS EVERY 6 HOURS PRN
Status: DISCONTINUED | OUTPATIENT
Start: 2023-04-05 | End: 2023-04-06 | Stop reason: HOSPADM

## 2023-04-05 RX ORDER — FENTANYL CITRATE 50 UG/ML
50 INJECTION, SOLUTION INTRAMUSCULAR; INTRAVENOUS EVERY 5 MIN PRN
Status: DISCONTINUED | OUTPATIENT
Start: 2023-04-05 | End: 2023-04-05

## 2023-04-05 RX ORDER — OXYCODONE HYDROCHLORIDE 5 MG/1
10 TABLET ORAL EVERY 4 HOURS PRN
Status: DISCONTINUED | OUTPATIENT
Start: 2023-04-05 | End: 2023-04-06 | Stop reason: HOSPADM

## 2023-04-05 RX ORDER — HYDROMORPHONE HCL IN WATER/PF 6 MG/30 ML
0.2 PATIENT CONTROLLED ANALGESIA SYRINGE INTRAVENOUS
Status: DISCONTINUED | OUTPATIENT
Start: 2023-04-05 | End: 2023-04-06 | Stop reason: HOSPADM

## 2023-04-05 RX ORDER — SODIUM CHLORIDE, SODIUM LACTATE, POTASSIUM CHLORIDE, CALCIUM CHLORIDE 600; 310; 30; 20 MG/100ML; MG/100ML; MG/100ML; MG/100ML
INJECTION, SOLUTION INTRAVENOUS CONTINUOUS
Status: DISCONTINUED | OUTPATIENT
Start: 2023-04-05 | End: 2023-04-05

## 2023-04-05 RX ORDER — POLYETHYLENE GLYCOL 3350 17 G/17G
17 POWDER, FOR SOLUTION ORAL DAILY
Status: DISCONTINUED | OUTPATIENT
Start: 2023-04-06 | End: 2023-04-06 | Stop reason: HOSPADM

## 2023-04-05 RX ORDER — OXYCODONE HYDROCHLORIDE 5 MG/1
5 TABLET ORAL EVERY 4 HOURS PRN
Status: DISCONTINUED | OUTPATIENT
Start: 2023-04-05 | End: 2023-04-06 | Stop reason: HOSPADM

## 2023-04-05 RX ORDER — FENTANYL CITRATE 50 UG/ML
25 INJECTION, SOLUTION INTRAMUSCULAR; INTRAVENOUS EVERY 5 MIN PRN
Status: DISCONTINUED | OUTPATIENT
Start: 2023-04-05 | End: 2023-04-05

## 2023-04-05 RX ORDER — ACETAMINOPHEN 325 MG/1
650 TABLET ORAL EVERY 4 HOURS PRN
Status: DISCONTINUED | OUTPATIENT
Start: 2023-04-08 | End: 2023-04-06 | Stop reason: HOSPADM

## 2023-04-05 RX ORDER — HYDRALAZINE HYDROCHLORIDE 20 MG/ML
10 INJECTION INTRAMUSCULAR; INTRAVENOUS EVERY 6 HOURS PRN
Status: DISCONTINUED | OUTPATIENT
Start: 2023-04-05 | End: 2023-04-06 | Stop reason: HOSPADM

## 2023-04-05 RX ORDER — AMOXICILLIN 250 MG
1 CAPSULE ORAL 2 TIMES DAILY
Status: DISCONTINUED | OUTPATIENT
Start: 2023-04-05 | End: 2023-04-06 | Stop reason: HOSPADM

## 2023-04-05 RX ORDER — ACETAMINOPHEN 325 MG/1
975 TABLET ORAL EVERY 4 HOURS PRN
Status: DISCONTINUED | OUTPATIENT
Start: 2023-04-05 | End: 2023-04-05 | Stop reason: HOSPADM

## 2023-04-05 RX ORDER — ACETAMINOPHEN 325 MG/1
975 TABLET ORAL EVERY 8 HOURS
Status: DISCONTINUED | OUTPATIENT
Start: 2023-04-05 | End: 2023-04-06 | Stop reason: HOSPADM

## 2023-04-05 RX ORDER — ONDANSETRON 4 MG/1
4 TABLET, ORALLY DISINTEGRATING ORAL EVERY 6 HOURS PRN
Status: DISCONTINUED | OUTPATIENT
Start: 2023-04-05 | End: 2023-04-06 | Stop reason: HOSPADM

## 2023-04-05 RX ORDER — LIDOCAINE 40 MG/G
CREAM TOPICAL
Status: DISCONTINUED | OUTPATIENT
Start: 2023-04-05 | End: 2023-04-06 | Stop reason: HOSPADM

## 2023-04-05 RX ORDER — ONDANSETRON 2 MG/ML
4 INJECTION INTRAMUSCULAR; INTRAVENOUS EVERY 30 MIN PRN
Status: DISCONTINUED | OUTPATIENT
Start: 2023-04-05 | End: 2023-04-05

## 2023-04-05 RX ORDER — HYDROCODONE BITARTRATE AND ACETAMINOPHEN 5; 325 MG/1; MG/1
1 TABLET ORAL EVERY 6 HOURS PRN
Qty: 10 TABLET | Refills: 0 | Status: SHIPPED | OUTPATIENT
Start: 2023-04-05 | End: 2023-04-08

## 2023-04-05 RX ORDER — DOCUSATE SODIUM 100 MG/1
100 CAPSULE, LIQUID FILLED ORAL 2 TIMES DAILY
Qty: 30 CAPSULE | Refills: 0 | Status: SHIPPED | OUTPATIENT
Start: 2023-04-05

## 2023-04-05 RX ORDER — EPHEDRINE SULFATE 50 MG/ML
INJECTION, SOLUTION INTRAMUSCULAR; INTRAVENOUS; SUBCUTANEOUS PRN
Status: DISCONTINUED | OUTPATIENT
Start: 2023-04-05 | End: 2023-04-05

## 2023-04-05 RX ORDER — HYDROMORPHONE HCL IN WATER/PF 6 MG/30 ML
0.4 PATIENT CONTROLLED ANALGESIA SYRINGE INTRAVENOUS
Status: DISCONTINUED | OUTPATIENT
Start: 2023-04-05 | End: 2023-04-06 | Stop reason: HOSPADM

## 2023-04-05 RX ORDER — PROCHLORPERAZINE MALEATE 5 MG
5 TABLET ORAL EVERY 6 HOURS PRN
Status: DISCONTINUED | OUTPATIENT
Start: 2023-04-05 | End: 2023-04-06 | Stop reason: HOSPADM

## 2023-04-05 RX ORDER — SODIUM CHLORIDE, SODIUM LACTATE, POTASSIUM CHLORIDE, CALCIUM CHLORIDE 600; 310; 30; 20 MG/100ML; MG/100ML; MG/100ML; MG/100ML
INJECTION, SOLUTION INTRAVENOUS CONTINUOUS PRN
Status: DISCONTINUED | OUTPATIENT
Start: 2023-04-05 | End: 2023-04-05

## 2023-04-05 RX ORDER — LIDOCAINE 40 MG/G
CREAM TOPICAL
Status: DISCONTINUED | OUTPATIENT
Start: 2023-04-05 | End: 2023-04-05 | Stop reason: HOSPADM

## 2023-04-05 RX ORDER — HEPARIN SODIUM 5000 [USP'U]/.5ML
5000 INJECTION, SOLUTION INTRAVENOUS; SUBCUTANEOUS EVERY 8 HOURS
Status: DISCONTINUED | OUTPATIENT
Start: 2023-04-06 | End: 2023-04-06 | Stop reason: HOSPADM

## 2023-04-05 RX ORDER — CEFAZOLIN SODIUM/WATER 2 G/20 ML
2 SYRINGE (ML) INTRAVENOUS SEE ADMIN INSTRUCTIONS
Status: DISCONTINUED | OUTPATIENT
Start: 2023-04-05 | End: 2023-04-05 | Stop reason: HOSPADM

## 2023-04-05 RX ORDER — CEFAZOLIN SODIUM/WATER 2 G/20 ML
2 SYRINGE (ML) INTRAVENOUS
Status: COMPLETED | OUTPATIENT
Start: 2023-04-05 | End: 2023-04-05

## 2023-04-05 RX ORDER — BISACODYL 10 MG
10 SUPPOSITORY, RECTAL RECTAL DAILY PRN
Status: DISCONTINUED | OUTPATIENT
Start: 2023-04-05 | End: 2023-04-06 | Stop reason: HOSPADM

## 2023-04-05 RX ORDER — PROPOFOL 10 MG/ML
INJECTION, EMULSION INTRAVENOUS PRN
Status: DISCONTINUED | OUTPATIENT
Start: 2023-04-05 | End: 2023-04-05

## 2023-04-05 RX ADMIN — FENTANYL CITRATE 25 MCG: 50 INJECTION, SOLUTION INTRAMUSCULAR; INTRAVENOUS at 14:52

## 2023-04-05 RX ADMIN — LISINOPRIL 20 MG: 10 TABLET ORAL at 08:10

## 2023-04-05 RX ADMIN — LIDOCAINE HYDROCHLORIDE 20 MG: 10 INJECTION, SOLUTION INFILTRATION; PERINEURAL at 14:51

## 2023-04-05 RX ADMIN — ROCURONIUM BROMIDE 30 MG: 10 INJECTION, SOLUTION INTRAVENOUS at 14:52

## 2023-04-05 RX ADMIN — HYDROMORPHONE HYDROCHLORIDE 0.4 MG: 0.2 INJECTION, SOLUTION INTRAMUSCULAR; INTRAVENOUS; SUBCUTANEOUS at 21:13

## 2023-04-05 RX ADMIN — SENNOSIDES AND DOCUSATE SODIUM 1 TABLET: 50; 8.6 TABLET ORAL at 21:12

## 2023-04-05 RX ADMIN — TRAZODONE HYDROCHLORIDE 150 MG: 50 TABLET ORAL at 21:12

## 2023-04-05 RX ADMIN — FENTANYL CITRATE 50 MCG: 50 INJECTION, SOLUTION INTRAMUSCULAR; INTRAVENOUS at 15:17

## 2023-04-05 RX ADMIN — FENTANYL CITRATE 50 MCG: 50 INJECTION, SOLUTION INTRAMUSCULAR; INTRAVENOUS at 15:56

## 2023-04-05 RX ADMIN — POTASSIUM CHLORIDE AND SODIUM CHLORIDE: 900; 150 INJECTION, SOLUTION INTRAVENOUS at 19:13

## 2023-04-05 RX ADMIN — PROPOFOL 20 MG: 10 INJECTION, EMULSION INTRAVENOUS at 15:17

## 2023-04-05 RX ADMIN — POTASSIUM CHLORIDE AND SODIUM CHLORIDE: 900; 150 INJECTION, SOLUTION INTRAVENOUS at 02:10

## 2023-04-05 RX ADMIN — ONDANSETRON 4 MG: 2 INJECTION INTRAMUSCULAR; INTRAVENOUS at 15:22

## 2023-04-05 RX ADMIN — AMLODIPINE BESYLATE 5 MG: 5 TABLET ORAL at 08:10

## 2023-04-05 RX ADMIN — ACETAMINOPHEN 975 MG: 325 TABLET ORAL at 21:12

## 2023-04-05 RX ADMIN — PIPERACILLIN AND TAZOBACTAM 3.38 G: 3; .375 INJECTION, POWDER, LYOPHILIZED, FOR SOLUTION INTRAVENOUS at 21:27

## 2023-04-05 RX ADMIN — PROPOFOL 130 MG: 10 INJECTION, EMULSION INTRAVENOUS at 14:51

## 2023-04-05 RX ADMIN — SODIUM CHLORIDE, POTASSIUM CHLORIDE, SODIUM LACTATE AND CALCIUM CHLORIDE: 600; 310; 30; 20 INJECTION, SOLUTION INTRAVENOUS at 14:41

## 2023-04-05 RX ADMIN — PROPOFOL 50 MG: 10 INJECTION, EMULSION INTRAVENOUS at 15:15

## 2023-04-05 RX ADMIN — PIPERACILLIN AND TAZOBACTAM 3.38 G: 3; .375 INJECTION, POWDER, LYOPHILIZED, FOR SOLUTION INTRAVENOUS at 03:47

## 2023-04-05 RX ADMIN — PROPOFOL 50 MG: 10 INJECTION, EMULSION INTRAVENOUS at 15:56

## 2023-04-05 RX ADMIN — PIPERACILLIN AND TAZOBACTAM 3.38 G: 3; .375 INJECTION, POWDER, LYOPHILIZED, FOR SOLUTION INTRAVENOUS at 12:00

## 2023-04-05 RX ADMIN — DEXAMETHASONE SODIUM PHOSPHATE 4 MG: 4 INJECTION, SOLUTION INTRA-ARTICULAR; INTRALESIONAL; INTRAMUSCULAR; INTRAVENOUS; SOFT TISSUE at 14:58

## 2023-04-05 RX ADMIN — FENTANYL CITRATE 50 MCG: 50 INJECTION, SOLUTION INTRAMUSCULAR; INTRAVENOUS at 15:22

## 2023-04-05 RX ADMIN — Medication 5 MG: at 15:03

## 2023-04-05 RX ADMIN — Medication 2 G: at 14:40

## 2023-04-05 RX ADMIN — SUGAMMADEX 200 MG: 100 INJECTION, SOLUTION INTRAVENOUS at 15:53

## 2023-04-05 RX ADMIN — SODIUM CHLORIDE, POTASSIUM CHLORIDE, SODIUM LACTATE AND CALCIUM CHLORIDE: 600; 310; 30; 20 INJECTION, SOLUTION INTRAVENOUS at 08:34

## 2023-04-05 RX ADMIN — MIRTAZAPINE 30 MG: 15 TABLET, FILM COATED ORAL at 21:12

## 2023-04-05 RX ADMIN — ACETAMINOPHEN 975 MG: 325 TABLET ORAL at 14:35

## 2023-04-05 RX ADMIN — FENTANYL CITRATE 25 MCG: 50 INJECTION, SOLUTION INTRAMUSCULAR; INTRAVENOUS at 15:11

## 2023-04-05 ASSESSMENT — ACTIVITIES OF DAILY LIVING (ADL)
ADLS_ACUITY_SCORE: 22

## 2023-04-05 NOTE — PROGRESS NOTES
[facility]    Medicine Progress Note - Hospitalist Service    Date of Admission:  4/3/2023    Assessment & Plan       Blair Quiles is a 89 year old male admitted on 4/3/2023. He presented RUQ pain.      4/5 :       Patient  Currently in PACU for lap raj  NPO, on iv fluids, iv zosyn  Surgery following   MRCP : no cbd stone, GB looks inflammed      Not medically ready for discharge at this time.        A/p :        1.  Acute cholecystitis        Choledocholithiasis        Elevated LFTs     Pt presented with the ED with significant post-prandial epigastric pain that he improved with NPO status.  Imaging reviewed and revealed cholelithiasis with GB wall thickening and pericholecystic fluid.       He had elevated LFTs and elevated total bilirubin.  Pain has improved suggesting possible passage of a stone. Gen surgery consult requested and is pending. Will keep NPO and on IVF.  ? MRCP imaging.  Likely will need cholecystectomy at some point.       Continue IV Zosyn for now     2.  HTN     We resume norvasc and lisinopril with holding BP parameters.  Will hold PTA hydrochlorothiazide, for now.     3.  Hyperlipidemia     Hold statin for now d/t elevation in LFTs     4.  Daily alcohol use  Noted.  Will check magnesium     5.  Hypokalemia  Will supplement per protocol.  Check mag.   PTA meds do include potassium supplement daily     6.  Bradycardia  Not on b-blockers PTA.  Will supplement electrolytes.  Continue to monitor HR closely on tele monitoring.     7. H/o anxiety  Will resume home medications           Diet: NPO per Anesthesia Guidelines for Procedure/Surgery Except for: Meds    DVT Prophylaxis: Pneumatic Compression Devices  [place] Catheter: Not present  Lines: None     Cardiac Monitoring: ACTIVE order. Indication: Procedural area  Code Status: Full Code      Clinically Significant Risk Factors Present on Admission        # Hypokalemia: Lowest K = 3.2 mmol/L in last 2 days, will replace  "as needed   # Hypocalcemia: Lowest Ca = 8.2 mg/dL in last 2 days, will monitor and replace as appropriate     # Hypoalbuminemia: Lowest albumin = 3.2 g/dL at 4/4/2023  8:54 AM, will monitor as appropriate     # Hypertension: home medication list includes antihypertensive(s)      # Overweight: Estimated body mass index is 28.04 kg/m  as calculated from the following:    Height as of this encounter: 1.702 m (5' 7\").    Weight as of this encounter: 81.2 kg (179 lb).           Disposition Plan      Expected Discharge Date: 04/05/2023      Destination: home with family  Discharge Comments: Michelle Vyas 4/5 1400.  potential D/C following surgery.          Glenn Renee MD  Hospitalist Service  [facility]  Securely message with [employer] (more info)  Text page via [employer] Paging/Directory   ______________________________________________________________________        Physical Exam   Vital Signs: Temp: 98  F (36.7  C) Temp src: Temporal BP: (!) 142/64 Pulse: 84   Resp: 17 SpO2: 93 % O2 Device: Nasal cannula Oxygen Delivery: 2 LPM  Weight: 179 lbs 0 oz        Medical Decision Making         Data     I have personally reviewed the following data over the past 24 hrs:    4.9  \   15.6   / 171     141 107 7 (L) /  96   3.5 25 0.80 \       ALT: 117 (H) AST: 54 (H) AP: 91 TBILI: 1.4 (H)   ALB: 3.4 (L) TOT PROTEIN: 5.9 (L) LIPASE: N/A       INR:  1.12 PTT:  N/A   D-dimer:  N/A Fibrinogen:  N/A       "

## 2023-04-05 NOTE — ANESTHESIA PREPROCEDURE EVALUATION
Anesthesia Pre-Procedure Evaluation    Patient: Blair Quiles   MRN: [id number] : 1931        Procedure : Procedure(s):  CHOLECYSTECTOMY, LAPAROSCOPIC          History reviewed. No pertinent past medical history.   Past Surgical History:   Procedure Laterality Date     INGUINAL HERNIA REPAIR Right 7/3/2019    Procedure: REPAIR, HERNIA, INGUINAL, OPEN;  Surgeon: Sabina Dominguez DO;  [facility]: [facility] OR;  Service: General     PROSTATECTOMY        No Known Allergies   Social History     Tobacco Use     Smoking status: Never     Smokeless tobacco: Never   Vaping Use     Vaping status: Not on file   Substance Use Topics     Alcohol use: Yes     Alcohol/week: 14.0 standard drinks of alcohol     Comment: Alcoholic Drinks/day: 2 glasses of wine / night      Wt Readings from Last 1 Encounters:   23 81.4 kg (179 lb 6.4 oz)        Anesthesia Evaluation   Pt has had prior anesthetic. Type: General.    No history of anesthetic complications       ROS/MED HX  ENT/Pulmonary:  - neg pulmonary ROS     Neurologic:  - neg neurologic ROS     Cardiovascular:     (+) Dyslipidemia hypertension-----    METS/Exercise Tolerance:     Hematologic:  - neg hematologic  ROS     Musculoskeletal:  - neg musculoskeletal ROS     GI/Hepatic:     (+) cholecystitis/cholelithiasis,  (-) GERD   Renal/Genitourinary:  - neg Renal ROS     Endo:  - neg endo ROS     Psychiatric/Substance Use:     (+) psychiatric history anxiety     Infectious Disease:  - neg infectious disease ROS     Malignancy:       Other:            Physical Exam    Airway        Mallampati: I   TM distance: > 3 FB   Neck ROM: full   Mouth opening: > 3 cm    Respiratory Devices and Support         Dental       (+) Minor Abnormalities - some fillings, tiny chips      Cardiovascular          Rhythm and rate: regular and normal     Pulmonary   pulmonary exam normal        breath sounds clear to auscultation           OUTSIDE LABS:  CBC:   Lab Results   Component Value  Date    WBC 4.9 04/05/2023    WBC 8.7 04/03/2023    HGB 15.6 04/05/2023    HGB 17.0 04/03/2023    HCT 45.0 04/05/2023    HCT 46.9 04/03/2023     04/05/2023     04/03/2023     BMP:   Lab Results   Component Value Date     04/05/2023     04/04/2023    POTASSIUM 3.5 04/05/2023    POTASSIUM 3.8 04/04/2023    CHLORIDE 107 04/05/2023    CHLORIDE 106 04/04/2023    CO2 25 04/05/2023    CO2 25 04/04/2023    BUN 7 (L) 04/05/2023    BUN 9 04/04/2023    CR 0.80 04/05/2023    CR 0.77 04/04/2023     (H) 04/05/2023    GLC 93 04/05/2023     COAGS:   Lab Results   Component Value Date    PTT 25 07/03/2019    INR 1.12 04/05/2023     POC: No results found for: BGM, HCG, HCGS  HEPATIC:   Lab Results   Component Value Date    ALBUMIN 3.4 (L) 04/05/2023    PROTTOTAL 5.9 (L) 04/05/2023     (H) 04/05/2023    AST 54 (H) 04/05/2023    ALKPHOS 91 04/05/2023    BILITOTAL 1.4 (H) 04/05/2023     OTHER:   Lab Results   Component Value Date    LACT 1.3 07/03/2019    LUCAS 8.2 (L) 04/05/2023    MAG 1.9 04/05/2023    LIPASE 75 (H) 04/03/2023       Anesthesia Plan    ASA Status:  3      Anesthesia Type: General.     - Airway: ETT   Induction: Propofol.   Maintenance: Balanced.        Consents    Anesthesia Plan(s) and associated risks, benefits, and realistic alternatives discussed. Questions answered and patient/representative(s) expressed understanding.    - Discussed:     - Discussed with:  Patient         Postoperative Care    Pain management: IV analgesics.   PONV prophylaxis: Ondansetron (or other 5HT-3)     Comments:    Other Comments: bhargav Motley MD

## 2023-04-05 NOTE — PLAN OF CARE
PRIMARY DIAGNOSIS: ACUTE PAIN  OUTPATIENT/OBSERVATION GOALS TO BE MET BEFORE DISCHARGE:  1. Pain Status: Pain free.    2. Return to near baseline physical activity: Yes    3. Cleared for discharge by consultants (if involved): No- Pt to have surgery tomorrow    Discharge Planner Nurse   Safe discharge environment identified: Yes  Barriers to discharge: Yes Pain management and pt having a lap raj tomorrow at 1400. Pt will be NPO at midnight.        Entered by: Diane Luu RN 04/04/2023 7:39 PM     Please review provider order for any additional goals.   Nurse to notify provider when observation goals have been met and patient is ready for discharge.

## 2023-04-05 NOTE — ANESTHESIA CARE TRANSFER NOTE
Patient: Blair Quiles    Procedure: Procedure(s):  CHOLECYSTECTOMY, LAPAROSCOPIC       Diagnosis: Acute cholecystitis [K81.0]  Diagnosis Additional Information: No value filed.    Anesthesia Type:   General     Note:    Oropharynx: oropharynx clear of all foreign objects and spontaneously breathing  Level of Consciousness: drowsy  Oxygen Supplementation: face mask  Level of Supplemental Oxygen (L/min / FiO2): 6  Independent Airway: airway patency satisfactory and stable  Dentition: dentition unchanged  Vital Signs Stable: post-procedure vital signs reviewed and stable  Report to RN Given: handoff report given  Patient transferred to: PACU    Handoff Report: Identifed the Patient, Identified the Reponsible Provider, Reviewed the pertinent medical history, Discussed the surgical course, Reviewed Intra-OP anesthesia mangement and issues during anesthesia, Set expectations for post-procedure period and Allowed opportunity for questions and acknowledgement of understanding      Vitals:  Vitals Value Taken Time   /80 04/05/23 1614   Temp 36.4  C (97.6  F) 04/05/23 1612   Pulse 94 04/05/23 1615   Resp 20 04/05/23 1615   SpO2 95 % 04/05/23 1615   Vitals shown include unvalidated device data.    Electronically Signed By: ISA Valentin CRNA  April 5, 2023  4:17 PM

## 2023-04-05 NOTE — PROGRESS NOTES
PRIMARY DIAGNOSIS: BILIARY COLIC/UNCOMPLICATED EARLY ACUTE CHOLECYSTITIS  OUTPATIENT/OBSERVATION GOALS TO BE MET BEFORE DISCHARGE:    1. Pain status: Pain free.  2. Stable vital signs and labs (if performed) at disposition: Yes  3. Tolerating adequate PO diet: Yes  4. Successful cholecystectomy or clear follow up plan with General Surgery team if immediate surgery not performed No  5. ADLs back to baseline?  Yes  6. Activity and level of assistance: Up with standby assistance.  7. Barriers to discharge noted Yes - Lap raj scheduled for tmrw.    Discharge Planner Nurse   Safe discharge environment identified: Yes  Barriers to discharge: Yes       Entered by: Cara Day RN 04/04/2023 11:52 PM   Lap raj scheduled for 4/5 @ 1400 w/ general surgery team.

## 2023-04-05 NOTE — PROGRESS NOTES
"General Surgery Progress Note:    Hospital Day # 0    ASSESSMENT:   1. Acute cholecystitis    2. Hypokalemia        Blair Quiles is a 89 year old male presenting with abdominal pain.  Patient was found to have cholelithiasis.  An MRCP did not identify any common bile duct obstruction.    PLAN:   -Proceed with laparoscopic cholecystectomy  -Patient is cleared from medical standpoint for surgery  -N.p.o. and continue with IV fluid resuscitation      SUBJECTIVE:   Blair Quiles was seen on rounds.  Patient states that he would like to proceed with surgery.  No further complaints at this time.  Patient has been n.p.o.    Patient Vitals for the past 24 hrs:   BP Temp Temp src Pulse Resp SpO2 Height Weight   04/05/23 0952 (!) 151/80 -- -- -- -- -- -- --   04/05/23 0800 (!) 194/91 -- -- -- -- -- -- --   04/05/23 0758 (!) 160/74 98.7  F (37.1  C) Oral 60 18 94 % -- --   04/05/23 0440 (!) 175/87 -- -- -- -- -- -- --   04/05/23 0347 (!) 201/98 97.9  F (36.6  C) Oral 58 20 93 % -- 81.4 kg (179 lb 6.4 oz)   04/04/23 2346 (!) 146/84 97.6  F (36.4  C) Oral 57 16 95 % -- --   04/04/23 2130 (!) 153/78 98  F (36.7  C) Oral 53 18 91 % -- --   04/04/23 1735 (!) 167/75 98.3  F (36.8  C) Oral 57 18 96 % 1.702 m (5' 7\") 81.3 kg (179 lb 3.2 oz)   04/04/23 1614 -- 98.1  F (36.7  C) Oral -- 16 -- -- --       Physical Exam:  General: NAD, pleasant  CV:RRR  LUNGS:Normal respiratory effort, no accessory muscle use  ABD: Abdomen soft, nondistended, minimal tenderness to palpation in the upper quadrants of the abdomen  EXT:no CCE    Admission on 04/03/2023   Component Date Value     Ventricular Rate 04/03/2023 57      Atrial Rate 04/03/2023 69      QRS Duration 04/03/2023 100      QT 04/03/2023 460      QTc 04/03/2023 447      P Axis 04/03/2023 41      R AXIS 04/03/2023 -8      T [place] 04/03/2023 -38      Interpretation ECG 04/03/2023                      Value:Undetermined rhythm  Possible Inferior infarct , age undetermined  Abnormal " ECG  When compared with ECG of 03-JUL-2019 04:11,  Current undetermined rhythm precludes rhythm comparison, needs review  Confirmed by SEE ED PROVIDER NOTE FOR, ECG INTERPRETATION ([id number]),  Susan Garcia ([id number]) on 4/4/2023 12:59:13 AM       Hold Specimen 04/03/2023 JIC      Hold Specimen 04/03/2023 JIC      Hold Specimen 04/03/2023 JIC      Hold Specimen 04/03/2023 JIC      Troponin I 04/03/2023 0.02      Sodium 04/03/2023 138      Potassium 04/03/2023 3.4 (L)      Chloride 04/03/2023 103      Carbon Dioxide (CO2) 04/03/2023 25      Anion Gap 04/03/2023 10      Urea Nitrogen 04/03/2023 12      Creatinine 04/03/2023 0.82      Calcium 04/03/2023 9.1      Glucose 04/03/2023 135 (H)      Alkaline Phosphatase 04/03/2023 99      AST 04/03/2023 156 (H)      ALT 04/03/2023 109 (H)      Protein Total 04/03/2023 6.8      Albumin 04/03/2023 3.9      Bilirubin Total 04/03/2023 2.6 (H)      GFR Estimate 04/03/2023 83      Lipase 04/03/2023 75 (H)      WBC Count 04/03/2023 8.7      RBC Count 04/03/2023 4.99      Hemoglobin 04/03/2023 17.0      Hematocrit 04/03/2023 46.9      MCV 04/03/2023 94      MCH 04/03/2023 34.1 (H)      MCHC 04/03/2023 36.2      RDW 04/03/2023 12.8      Platelet Count 04/03/2023 193      % Neutrophils 04/03/2023 84      % Lymphocytes 04/03/2023 7      % Monocytes 04/03/2023 8      % Eosinophils 04/03/2023 1      % Basophils 04/03/2023 0      % Immature Granulocytes 04/03/2023 0      NRBCs per 100 WBC 04/03/2023 0      Absolute Neutrophils 04/03/2023 7.3      Absolute Lymphocytes 04/03/2023 0.6 (L)      Absolute Monocytes 04/03/2023 0.7      Absolute Eosinophils 04/03/2023 0.1      Absolute Basophils 04/03/2023 0.0      Absolute Immature Granul* 04/03/2023 0.0      Absolute NRBCs 04/03/2023 0.0      Color Urine 04/03/2023 Yellow      Appearance Urine 04/03/2023 Clear      Glucose Urine 04/03/2023 Negative      Bilirubin Urine 04/03/2023 Negative      Ketones Urine 04/03/2023 Trace (A)      Specific  Gravity Urine 04/03/2023 1.017      Blood Urine 04/03/2023 0.1 mg/dL (A)      pH Urine 04/03/2023 6.5      Protein Albumin Urine 04/03/2023 10 (A)      Urobilinogen Urine 04/03/2023 <2.0      Nitrite Urine 04/03/2023 Negative      Leukocyte Esterase Urine 04/03/2023 Negative      Mucus Urine 04/03/2023 Present (A)      RBC Urine 04/03/2023 26 (H)      WBC Urine 04/03/2023 1      Hyaline Casts Urine 04/03/2023 3 (H)      Bilirubin Total 04/04/2023 1.6 (H)      Bilirubin Direct 04/04/2023 0.6 (H)      Protein Total 04/04/2023 5.7 (L)      Albumin 04/04/2023 3.2 (L)      Alkaline Phosphatase 04/04/2023 102      AST 04/04/2023 120 (H)      ALT 04/04/2023 171 (H)      Sodium 04/04/2023 139      Potassium 04/04/2023 3.2 (L)      Chloride 04/04/2023 106      Carbon Dioxide (CO2) 04/04/2023 25      Anion Gap 04/04/2023 8      Urea Nitrogen 04/04/2023 9      Creatinine 04/04/2023 0.77      Calcium 04/04/2023 8.2 (L)      Glucose 04/04/2023 91      GFR Estimate 04/04/2023 85      Hold Specimen 04/04/2023 JIC      Magnesium 04/04/2023 2.2      Magnesium 04/03/2023 1.9      Potassium 04/04/2023 3.4 (L)      Potassium 04/04/2023 3.8      Sodium 04/05/2023 141      Potassium 04/05/2023 3.5      Chloride 04/05/2023 107      Carbon Dioxide (CO2) 04/05/2023 25      Anion Gap 04/05/2023 9      Urea Nitrogen 04/05/2023 7 (L)      Creatinine 04/05/2023 0.80      Calcium 04/05/2023 8.2 (L)      Glucose 04/05/2023 93      Alkaline Phosphatase 04/05/2023 91      AST 04/05/2023 54 (H)      ALT 04/05/2023 117 (H)      Protein Total 04/05/2023 5.9 (L)      Albumin 04/05/2023 3.4 (L)      Bilirubin Total 04/05/2023 1.4 (H)      GFR Estimate 04/05/2023 84      WBC Count 04/05/2023 4.9      RBC Count 04/05/2023 4.60      Hemoglobin 04/05/2023 15.6      Hematocrit 04/05/2023 45.0      MCV 04/05/2023 98      MCH 04/05/2023 33.9 (H)      MCHC 04/05/2023 34.7      RDW 04/05/2023 13.0      Platelet Count 04/05/2023 171      INR 04/05/2023 1.12       Magnesium 04/05/2023 1.9      GLUCOSE BY METER POCT 04/05/2023 107 (H)         DO Peyman Marks DO  General Surgeon  [facility]  Surgery [address] [address]?  Office: [phone]  Employed by - [employer]  Pager: [phone]

## 2023-04-05 NOTE — UTILIZATION REVIEW
Admission Status; Secondary Review Determination   Under the authority of the Utilization Management Committee, the utilization review process indicated a secondary review on Blair Quiles. The review outcome is based on review of the medical records, discussions with staff, and applying clinical experience noted on the date of the review.   (x) Inpatient Status Appropriate - This patient's medical care is consistent with medical management for inpatient care and reasonable inpatient medical practice.     RATIONALE FOR DETERMINATION   89 year old male with a past medical history of SBO, who presents  to the ER for evaluation of abdominal pain.  Admitted with acute cholecystitis with LFTs elevation and concern for choledocholithiasis, surgery and GI consult, plan for cholecystectomy, LFTs still elevated, still on IV antibiotics, still n.p.o., on IV fluid, replace electrolytes.  Crossing second midnight    At the time of admission with the information available to the attending physician more than 2 nights Hospital complex care was anticipated, based on patient risk of adverse outcome if treated as outpatient and complex care required. Inpatient admission is appropriate based on the Medicare guidelines.   The information on this document is developed by the utilization review team in order for the business office to ensure compliance. This only denotes the appropriateness of proper admission status and does not reflect the quality of care rendered.   The definitions of Inpatient Status and Observation Status used in making the determination above are those provided in the CMS Coverage Manual, Chapter 1 and Chapter 6, section 70.4.   Sincerely,   Oseas Marlow MD  Utilization Review  Physician Advisor  [facility]

## 2023-04-05 NOTE — DISCHARGE INSTRUCTIONS
Follow up: It is our practice to have all patients follow up with us 2-3 weeks after their surgery to ensure they are recovering well.  For straightforwardlaparoscopic procedures, this can be done either in clinic as a scheduled follow up appointment, or over the phone.  If you would like a scheduled follow up appointment in clinic, please call us at [phone] to schedule an appointment at your convenience.  If you would prefer to follow up with us by phone please let us know so that we may contact you 2-3 weeks following your procedure.         Diet: Regular diet. Patients can have difficulty with constipation following surgery, due in part to the administration of narcotic medications.  If you are suffering with constipation, you should avoid foods such as hard cheeses or red meat.  Foods high infiber are recommended.       Activity: You should continue to be active at home, including ambulating frequently.  If possible try to limit the amount of time spent in bed.     Restrictions: You have no liftingrestrictions post operatively, but may wish to avoid strenuous physical activity for 1-2 weeks.  You should limit your physical activity if it causes you discomfort; however, this should resolve within 1-2 weeks.   Walking does not count as strenuous physical activity.  You are safe to walk up and down stairs.  Following 2 weeks you may resume all normal physical activity.     Wound / drain care: Your incisions are closed using absorbale sutures.  The skin is sealed with a surgical glue.  Do not peal the glue off.  Please allow the glue to peal off on its own.      It is normal to have a smallrim of red present around the incisions. This should not, however, extend beyond 1/4 inch from the incision.  If your incisions become increasingly tender, red, or draining, please contact us.        Bathing: Youmay shower after 24 hours from surgery.  It is ok to get your incisions wet, but avoid rubbing them.  Avoid  soaking in bath tubs, or swimming in lakes, pools, or streams for 4 weeks following surgery.

## 2023-04-05 NOTE — PLAN OF CARE
Problem: Cholecystectomy  Goal: Absence of Infection Signs and Symptoms  Outcome: Progressing  Goal: Nausea and Vomiting Relief  Outcome: Progressing  Goal: Effective Oxygenation and Ventilation  Outcome: Progressing  Intervention: Optimize Oxygenation and Ventilation  Recent Flowsheet Documentation  Taken 4/5/2023 0800 by Erica Chou RN  Head of Bed (HOB) Positioning: HOB at 20-30 degrees     Problem: Plan of Care - These are the overarching goals to be used throughout the patient stay.    Goal: Absence of Hospital-Acquired Illness or Injury  Intervention: Identify and Manage Fall Risk  Recent Flowsheet Documentation  Taken 4/5/2023 0800 by Erica Chou RN  Safety Promotion/Fall Prevention:    nonskid shoes/slippers when out of bed    patient and family education    safety round/check completed    activity supervised  Intervention: Prevent Skin Injury  Recent Flowsheet Documentation  Taken 4/5/2023 0800 by Erica Chou RN  Body Position: position changed independently     Problem: Cholecystectomy  Goal: Anesthesia/Sedation Recovery  Intervention: Optimize Anesthesia Recovery  Recent Flowsheet Documentation  Taken 4/5/2023 0800 by Erica Chou RN  Safety Promotion/Fall Prevention:    nonskid shoes/slippers when out of bed    patient and family education    safety round/check completed    activity supervised   Goal Outcome Evaluation: Patient doing well this morning. A/O x 4.Denies pain or discomfort. Abdomen is mildly tender with palpation. NPO since midnight, had lap cholecystectomy this afternoon. Patient tolerated procedure very well. Vital signs stable upon arrival back to unit. 02 stats in the mid- high 90's on room air. No c/o pain or discomfort. 4 lap sites present, surgical incisions intact and closed using dermabond. Bowel sounds hypoactive in all 4 quads. Currently resting comfortably with supportive family at bedside.

## 2023-04-05 NOTE — OP NOTE
[facility]    Operative Note    Pre-operative diagnosis: Acute cholecystitis [K81.0]  Post-operative diagnosis Same as pre-operative diagnosis    Procedure: Procedure(s):  CHOLECYSTECTOMY, LAPAROSCOPIC  Surgeon: Surgeon(s) and Role:     * Peyman Dietz DO - Primary  Anesthesia: General   Estimated Blood Loss: 10 mL from 4/5/2023  2:46 PM to 4/5/2023  4:11 PM      Drains: None  Specimens:   ID Type Source Tests Collected by Time Destination   1 : Gallbladder with stones Tissue Gallbladder with Stone(s) SURGICAL PATHOLOGY EXAM Peyman Dietz DO 4/5/2023  3:53 PM      Findings:     -  Acute cholecystitis.    Complications: None.  Implants: * No implants in log *      Indication: 89-year-old male presenting with abdominal pain.  Patient was worked up and found to have imaging concerning for acute cholecystitis.  As result the patient underwent an MRCP to rule out any common bile duct stones.  The MRCP was negative.  I then offered the patient procedure of laparoscopic cholecystectomy. The risks and benefits of the procedure were explained detail to the patient. The risks include infection, bleeding, damage to the surrounding structures. Patient verbalized understanding provided consent to undergo the procedure above.      Procedure: Patient was brought back to the operating room and was placed on the operating table in the supine position.  The patient's extremities were padded and positioned in the usual fashion.  The patient then underwent anesthesia sedation and intubation.  The patient's abdomen was prepped and draped in the usual sterile fashion.  Prior to initiating the procedure, a timeout was completed.  All present were in agreement.    1% lidocaine with epinephrine was instilled in Jaramillo's point over the left upper quadrant.  An 11 blade was then used to make a small skin incision at Jaramillo's point.  A Veress needle was then inserted into the patient's abdomen.  A saline drop test  was then completed.  Insufflation was then initiated.  A 5 mm trocar was inserted at the infraumbilical port site with a Visiport.  Once insufflation was completed, a general survey was completed.  I could identify that the patient had an inflamed gallbladder.    A 12 mm port was placed in the epigastric region.  Two 5 mm ports were placed in the right upper quadrant.  These ports were placed under direct visualization.  The omentum that was adherent onto the anterior face of the gallbladder was then carefully taken down using electrocautery.  The gallbladder was then grasped with gator graspers and retracted cephalad.  The cystic duct and cystic artery were then carefully dissected and isolated with a combination of blunt dissection with the [place] graspers as well as electrocautery.  Once the cystic artery and cystic duct were isolated the critical view was obtained.  An Endo Clip was used to clip across the cystic artery and duct.  Endoscopic bisi were then used to transect across the cystic duct and cystic artery.  The gallbladder was removed off of the liver bed using electrocautery.  The gallbladder was then removed from the abdomen using an Endo Catch bag through the 12 mm epigastric port site.      Inspection of the liver bed revealed good hemostasis.  The fascia of the 12 mm port site was then closed using an 0 Vicryl suture with a suture passer.  A 3-0 Vicryl suture was used to perform deep dermal sutures at the 12 mm port site.  All surgical sites were then closed with a 4-0 Vicryl suture in a subcuticular fashion.  Surgical glue was used to reinforce all surgical skin incisions.  At the end of the procedure, a final count was completed.  I was told all sharps, sponges, instruments were accounted for.  The patient tolerated the procedure with no complications.  The patient was then extubated and brought back to the PACU in stable condition.      Peyman Dietz DO  General Surgeon  [facility]   Surgery Clinic - [address]  [place] [address]?  Office: [phone]  Employed by - [facility]  Pager: [phone]

## 2023-04-05 NOTE — ANESTHESIA POSTPROCEDURE EVALUATION
Patient: Blair Quiles    Procedure: Procedure(s):  CHOLECYSTECTOMY, LAPAROSCOPIC       Anesthesia Type:  General    Note:  Disposition: Inpatient   Postop Pain Control: Uneventful            Sign Out: Well controlled pain   PONV: No   Neuro/Psych: Uneventful            Sign Out: Acceptable/Baseline neuro status   Airway/Respiratory: Uneventful            Sign Out: Acceptable/Baseline resp. status   CV/Hemodynamics: Uneventful            Sign Out: Acceptable CV status; No obvious hypovolemia; No obvious fluid overload   Other NRE: NONE   DID A NON-ROUTINE EVENT OCCUR? No           Last vitals:  Vitals Value Taken Time   /65 04/05/23 1710   Temp 36.7  C (98  F) 04/05/23 1700   Pulse 84 04/05/23 1713   Resp 17 04/05/23 1711   SpO2 93 % 04/05/23 1713   Vitals shown include unvalidated device data.    Electronically Signed By: Esa Kimbrough MD  April 5, 2023  6:18 PM

## 2023-04-05 NOTE — PLAN OF CARE
A&O. Sbp elevated this AM. SB on tele (40s-50s). Denies pain. Abdomen tender upon palpation. IV zosyn administered per orders. Left PIV remains patent w/ NS+20KCL @ 100mL/hr. Tolerating ambulation, voiding adequately. Appears to have slept well.

## 2023-04-05 NOTE — ANESTHESIA PROCEDURE NOTES
Airway       Patient location during procedure: OR       Procedure Start/Stop Times: 4/5/2023 2:54 PM and 4/5/2023 2:56 PM  Staff -        CRNA: Bob Beaulieu APRN CRNA       Performed By: CRNA  Consent for Airway        Urgency: elective  Indications and Patient Condition       Indications for airway management: isaac-procedural and airway protection       Induction type:intravenous       Mask difficulty assessment: 1 - vent by mask    Final Airway Details       Final airway type: endotracheal airway       Successful airway: ETT - single  Endotracheal Airway Details        ETT size (mm): 8.0       Cuffed: yes       Cuff volume (mL): 6       Successful intubation technique: direct laryngoscopy       DL Blade Type: [place] 2       Grade View of Cords: 1       Adjucts: stylet       Position: Right       Measured from: lips       Secured at (cm): 23       Bite block used: None    Post intubation assessment        Placement verified by: capnometry, equal breath sounds and chest rise        Number of attempts at approach: 1       Number of other approaches attempted: 0       Secured with: cloth tape       Ease of procedure: easy       Dentition: Intact and Unchanged    Medication(s) Administered   Medication Administration Time: 4/5/2023 2:54 PM

## 2023-04-06 ENCOUNTER — APPOINTMENT (OUTPATIENT)
Dept: OCCUPATIONAL THERAPY | Facility: CLINIC | Age: 88
DRG: 418 | End: 2023-04-06
Attending: INTERNAL MEDICINE
Payer: MEDICARE

## 2023-04-06 VITALS
RESPIRATION RATE: 16 BRPM | OXYGEN SATURATION: 94 % | SYSTOLIC BLOOD PRESSURE: 139 MMHG | DIASTOLIC BLOOD PRESSURE: 63 MMHG | HEART RATE: 55 BPM | TEMPERATURE: 97.8 F | BODY MASS INDEX: 28.25 KG/M2 | WEIGHT: 180 LBS | HEIGHT: 67 IN

## 2023-04-06 LAB
ALBUMIN SERPL-MCNC: 3.2 G/DL (ref 3.5–5)
ALP SERPL-CCNC: 85 U/L (ref 45–120)
ALT SERPL W P-5'-P-CCNC: 75 U/L (ref 0–45)
ANION GAP SERPL CALCULATED.3IONS-SCNC: 8 MMOL/L (ref 5–18)
AST SERPL W P-5'-P-CCNC: 36 U/L (ref 0–40)
BILIRUB DIRECT SERPL-MCNC: 0.4 MG/DL
BILIRUB SERPL-MCNC: 1.2 MG/DL (ref 0–1)
BUN SERPL-MCNC: 10 MG/DL (ref 8–28)
CALCIUM SERPL-MCNC: 8.1 MG/DL (ref 8.5–10.5)
CHLORIDE BLD-SCNC: 109 MMOL/L (ref 98–107)
CO2 SERPL-SCNC: 23 MMOL/L (ref 22–31)
CREAT SERPL-MCNC: 0.74 MG/DL (ref 0.7–1.3)
ERYTHROCYTE [DISTWIDTH] IN BLOOD BY AUTOMATED COUNT: 12.9 % (ref 10–15)
GFR SERPL CREATININE-BSD FRML MDRD: 86 ML/MIN/1.73M2
GLUCOSE BLD-MCNC: 115 MG/DL (ref 70–125)
HCT VFR BLD AUTO: 41.4 % (ref 40–53)
HGB BLD-MCNC: 15 G/DL (ref 13.3–17.7)
MAGNESIUM SERPL-MCNC: 1.9 MG/DL (ref 1.8–2.6)
MCH RBC QN AUTO: 34.4 PG (ref 26.5–33)
MCHC RBC AUTO-ENTMCNC: 36.2 G/DL (ref 31.5–36.5)
MCV RBC AUTO: 95 FL (ref 78–100)
PHOSPHATE SERPL-MCNC: 3.6 MG/DL (ref 2.5–4.5)
PLATELET # BLD AUTO: 167 10E3/UL (ref 150–450)
POTASSIUM BLD-SCNC: 3.8 MMOL/L (ref 3.5–5)
PROT SERPL-MCNC: 5.6 G/DL (ref 6–8)
RBC # BLD AUTO: 4.36 10E6/UL (ref 4.4–5.9)
SODIUM SERPL-SCNC: 140 MMOL/L (ref 136–145)
WBC # BLD AUTO: 7.1 10E3/UL (ref 4–11)

## 2023-04-06 PROCEDURE — 36415 COLL VENOUS BLD VENIPUNCTURE: CPT | Performed by: INTERNAL MEDICINE

## 2023-04-06 PROCEDURE — 83735 ASSAY OF MAGNESIUM: CPT | Performed by: SURGERY

## 2023-04-06 PROCEDURE — 97535 SELF CARE MNGMENT TRAINING: CPT | Mod: GO

## 2023-04-06 PROCEDURE — 250N000013 HC RX MED GY IP 250 OP 250 PS 637: Performed by: SURGERY

## 2023-04-06 PROCEDURE — 250N000011 HC RX IP 250 OP 636: Performed by: SURGERY

## 2023-04-06 PROCEDURE — 97165 OT EVAL LOW COMPLEX 30 MIN: CPT | Mod: GO

## 2023-04-06 PROCEDURE — 99239 HOSP IP/OBS DSCHRG MGMT >30: CPT | Performed by: INTERNAL MEDICINE

## 2023-04-06 PROCEDURE — 85027 COMPLETE CBC AUTOMATED: CPT | Performed by: SURGERY

## 2023-04-06 PROCEDURE — 82248 BILIRUBIN DIRECT: CPT | Performed by: INTERNAL MEDICINE

## 2023-04-06 PROCEDURE — 84100 ASSAY OF PHOSPHORUS: CPT | Performed by: SURGERY

## 2023-04-06 PROCEDURE — 80053 COMPREHEN METABOLIC PANEL: CPT | Performed by: INTERNAL MEDICINE

## 2023-04-06 RX ORDER — CIPROFLOXACIN 500 MG/1
500 TABLET, FILM COATED ORAL 2 TIMES DAILY
Qty: 8 TABLET | Refills: 0 | Status: SHIPPED | OUTPATIENT
Start: 2023-04-06 | End: 2023-04-10

## 2023-04-06 RX ORDER — METRONIDAZOLE 500 MG/1
500 TABLET ORAL 2 TIMES DAILY
Qty: 8 TABLET | Refills: 0 | Status: SHIPPED | OUTPATIENT
Start: 2023-04-06 | End: 2023-04-10

## 2023-04-06 RX ADMIN — POTASSIUM CHLORIDE AND SODIUM CHLORIDE: 900; 150 INJECTION, SOLUTION INTRAVENOUS at 05:18

## 2023-04-06 RX ADMIN — AMLODIPINE BESYLATE 5 MG: 5 TABLET ORAL at 08:52

## 2023-04-06 RX ADMIN — PIPERACILLIN AND TAZOBACTAM 3.38 G: 3; .375 INJECTION, POWDER, LYOPHILIZED, FOR SOLUTION INTRAVENOUS at 05:13

## 2023-04-06 RX ADMIN — PIPERACILLIN AND TAZOBACTAM 3.38 G: 3; .375 INJECTION, POWDER, LYOPHILIZED, FOR SOLUTION INTRAVENOUS at 11:18

## 2023-04-06 RX ADMIN — LISINOPRIL 20 MG: 10 TABLET ORAL at 08:52

## 2023-04-06 RX ADMIN — HEPARIN SODIUM 5000 UNITS: 5000 INJECTION, SOLUTION INTRAVENOUS; SUBCUTANEOUS at 11:18

## 2023-04-06 RX ADMIN — HYDROMORPHONE HYDROCHLORIDE 0.2 MG: 0.2 INJECTION, SOLUTION INTRAMUSCULAR; INTRAVENOUS; SUBCUTANEOUS at 01:58

## 2023-04-06 RX ADMIN — ACETAMINOPHEN 975 MG: 325 TABLET ORAL at 05:12

## 2023-04-06 RX ADMIN — POLYETHYLENE GLYCOL 3350 17 G: 17 POWDER, FOR SOLUTION ORAL at 08:52

## 2023-04-06 RX ADMIN — SENNOSIDES AND DOCUSATE SODIUM 1 TABLET: 50; 8.6 TABLET ORAL at 08:52

## 2023-04-06 ASSESSMENT — ACTIVITIES OF DAILY LIVING (ADL)
ADLS_ACUITY_SCORE: 22
IADL_COMMENTS: IND FOR IADLS, DRIVES
ADLS_ACUITY_SCORE: 22

## 2023-04-06 NOTE — PLAN OF CARE
Problem: Cholecystectomy  Goal: Absence of Bleeding  Outcome: Progressing  Goal: Fluid and Electrolyte Balance  Outcome: Progressing  Goal: Optimal Pain Control and Function  Outcome: Progressing  Intervention: Prevent or Manage Pain  Recent Flowsheet Documentation  Taken 4/6/2023 0800 by Erica Chou RN  Pain Management Interventions: medication (see MAR)  Goal: Nausea and Vomiting Relief  Outcome: Progressing     Problem: Risk for Delirium  Goal: Improved Behavioral Control  Outcome: Progressing  Goal: Improved Sleep  Outcome: Progressing     Problem: Plan of Care - These are the overarching goals to be used throughout the patient stay.    Goal: Absence of Hospital-Acquired Illness or Injury  Intervention: Identify and Manage Fall Risk  Recent Flowsheet Documentation  Taken 4/6/2023 0800 by Erica Chou RN  Safety Promotion/Fall Prevention:    nonskid shoes/slippers when out of bed    patient and family education    safety round/check completed    activity supervised  Intervention: Prevent and Manage VTE (Venous Thromboembolism) Risk  Recent Flowsheet Documentation  Taken 4/6/2023 0800 by Erica Chou RN  VTE Prevention/Management: SCDs (sequential compression devices) on  Goal: Optimal Comfort and Wellbeing  Intervention: Monitor Pain and Promote Comfort  Recent Flowsheet Documentation  Taken 4/6/2023 0800 by Erica Chou RN  Pain Management Interventions: medication (see MAR)     Problem: Cholecystectomy  Goal: Anesthesia/Sedation Recovery  Intervention: Optimize Anesthesia Recovery  Recent Flowsheet Documentation  Taken 4/6/2023 0800 by Erica Chou RN  Safety Promotion/Fall Prevention:    nonskid shoes/slippers when out of bed    patient and family education    safety round/check completed    activity supervised   Goal Outcome Evaluation: Patient doing very well this morning. States his abdominal pain is minimal, and tolerable with PRN tylenol. Patient passing gas, ambulating to bathroom with  stand by assistance. Tolerating a regular diet. Vital signs stable. Patient scheduled to discharge today.

## 2023-04-06 NOTE — PROGRESS NOTES
Care Management Discharge Note    Discharge Date: 04/06/2023       Discharge Disposition: Home    Discharge Services: None    Discharge DME: None    Discharge Transportation: family or friend will provide    Private pay costs discussed: Not applicable    Education Provided on the Discharge Plan:  AVS per bedside nurse   Persons Notified of Discharge Plans: patient  Patient/Family in Agreement with the Plan: yes    Additional Information:  Pt discharging to home with wife.  Family will transport.  OT recommended home with assist. No further CM needs identified.    Andrea Machado RN

## 2023-04-06 NOTE — PROGRESS NOTES
"ASSESSMENT:  1. Acute cholecystitis    2. Hypokalemia        Blair Quiles is a 89 year old male who is s/p lap raj on 4/5, POD# 1     PLAN:  -ADAT  -Increase activity as tolerated  -Ok with discharge from our standpoint, surgery orders and recs placed    William Alva PA-C  [facility]  Surgery Clinic [address]  [address]  [place] [address]?  Office: [phone]      SUBJECTIVE:   He is doing good, pain tolerable, no n/v, tolerating liquids, passing gas    Patient Vitals for the past 24 hrs:   BP Temp Temp src Pulse Resp SpO2 Height Weight   04/06/23 0732 (!) 151/69 97.8  F (36.6  C) Oral 61 18 96 % -- --   04/06/23 0640 -- -- -- -- -- -- -- 81.6 kg (180 lb)   04/06/23 0315 (!) 142/74 97.8  F (36.6  C) Oral 84 16 94 % -- --   04/05/23 2347 123/65 98  F (36.7  C) Oral 78 18 95 % -- --   04/05/23 1959 (!) 163/79 99  F (37.2  C) Oral 92 18 92 % -- --   04/05/23 1735 (!) 161/68 97.8  F (36.6  C) Oral 84 16 93 % -- --   04/05/23 1700 (!) 143/65 98  F (36.7  C) Temporal 80 15 95 % -- --   04/05/23 1650 (!) 146/65 -- -- 82 17 94 % -- --   04/05/23 1640 (!) 142/64 97.4  F (36.3  C) Temporal 84 19 93 % -- --   04/05/23 1630 (!) 146/62 -- -- 89 20 94 % -- --   04/05/23 1620 (!) 167/75 -- -- 92 17 95 % -- --   04/05/23 1612 (!) 178/80 97.6  F (36.4  C) Temporal 95 18 95 % -- --   04/05/23 1420 (!) 156/70 98.6  F (37  C) Temporal 56 16 97 % 1.702 m (5' 7\") 81.2 kg (179 lb)   04/05/23 0952 (!) 151/80 -- -- -- -- -- -- --        PHYSICAL EXAM:  GEN: No acute distress, comfortable  ABD:soft, nondistended, expected ttp, incisions c/d/i  EXT:No cyanosis, edema or obvious abnormalities    04/05 0700 - 04/06 0659  In: 1180 [P.O.:480; I.V.:700]  Out: 10     Lab Results   Component Value Date    WBC 7.1 04/06/2023    HGB 15.0 04/06/2023    HCT 41.4 04/06/2023    MCV 95 04/06/2023     04/06/2023     INR/Prothrombin Time  Recent Labs   Lab 04/06/23  0729      CO2 23   BUN 10 "     Lab Results   Component Value Date    ALT 75 (H) 04/06/2023    AST 36 04/06/2023    ALKPHOS 85 04/06/2023

## 2023-04-06 NOTE — PLAN OF CARE
Problem: Cholecystectomy  Goal: Absence of Bleeding  Outcome: Progressing     Problem: Cholecystectomy  Goal: Optimal Pain Control and Function  Outcome: Progressing     Problem: Cholecystectomy  Goal: Nausea and Vomiting Relief  Outcome: Progressing     Problem: Cholecystectomy  Goal: Effective Oxygenation and Ventilation  Outcome: Progressing     Pt A&Ox4, able to make needs known. Pt reporting pain 10/10 this shift, PRN dilaudid given with relief. Pt on RA, no SOB reported. Pt denies nausea this shift. Lap raj sites C/D/I. Pt on 0.9% sodium chloride + KCl 20mEq/L gtt running @100ml/hr. IV abx given per MAR. Pt SBA to toilet, voiding appropriately.  Pt sleeping between cares. No other acute changes noted this shift.     Anna Jesus, RN  [phone]

## 2023-04-06 NOTE — PROGRESS NOTES
Occupational Therapy Discharge Summary    Reason for therapy discharge:    All goals and outcomes met, no further needs identified.    Progress towards therapy goal(s). See goals on Care Plan in [facility] electronic health record for goal details.  Goals met    Therapy recommendation(s):    No further therapy is recommended. Defer to PT for TB strengthening and mobility.       04/06/23 1040   Appointment Info   Signing Clinician's Name / Credentials (OT) Diane Carson, OTR/L   Living Environment   People in Home spouse   Current Living Arrangements independent living facility   Living Environment Comments elevator in building, has tub shower and walk-in shower with built in seat, adjustable bed   Self-Care   Equipment Currently Used at Home none  (has cane and 4WW for long walks)   Activity/Exercise/Self-Care Comment ind for BADLs   Instrumental Activities of Daily Living (IADL)   IADL Comments ind for IADLs, drives   General Information   Onset of Illness/Injury or Date of Surgery 04/03/23   Referring Physician Glenn Renee MD   Patient/Family Therapy Goal Statement (OT) go home   Additional Occupational Profile Info/Pertinent History of Current Problem admitted with abdominal pain. s/p lap raj.   Existing Precautions/Restrictions abdominal   Cognitive Status Examination   Orientation Status orientation to person, place and time   Affect/Mental Status (Cognitive) WFL   Follows Commands WFL   Cognitive Status Comments 1/3 short-term recall - recals remaining words with category cue   Range of Motion Comprehensive   General Range of Motion no range of motion deficits identified   Strength Comprehensive (MMT)   General Manual Muscle Testing (MMT) Assessment no strength deficits identified   Bed Mobility   Bed Mobility supine-sit;sit-supine   Supine-Sit [place] (Bed Mobility) supervision   Sit-Supine [place] (Bed Mobility) supervision   Comment (Bed Mobility) HOB slightly elevated to simulate home   Transfers    Transfers sit-stand transfer;toilet transfer   Sit-Stand Transfer   Sit-Stand [place] (Transfers) supervision   Toilet Transfer   Type (Toilet Transfer) sit-stand;stand-sit   [place] Level (Toilet Transfer) supervision;verbal cues   Balance   Balance Comments SBA for mobility 75 ft 2x   Activities of Daily Living   BADL Assessment/Intervention lower body dressing;toileting   Lower Body Dressing Assessment/Training   [place] Level (Lower Body Dressing) supervision;verbal cues   Toileting   [place] Level (Toileting) supervision;verbal cues   Clinical Impression   Criteria for Skilled Therapeutic Interventions Met (OT) Yes, treatment indicated   OT Diagnosis dec BADL indep   OT Problem List-Impairments impacting ADL problems related to;post-surgical precautions;pain   Assessment of Occupational Performance 1-3 Performance Deficits   Identified Performance Deficits LB dressing, bathing, household mobility   Planned Therapy Interventions (OT) ADL retraining;home program guidelines;progressive activity/exercise;transfer training   Clinical Decision Making Complexity (OT) low complexity   Risk & Benefits of therapy have been explained evaluation/treatment results reviewed;participants included;patient;spouse/significant other;son   OT Total Evaluation Time   OT Eval, Low Complexity Minutes (88064) 10   OT Goals   Therapy Frequency (OT) One time eval and treatment   OT Predicted Duration/Target Date for Goal Attainment 04/06/23   OT Goals Lower Body Dressing;Toilet Transfer/Toileting;OT Goal 1   OT: Lower Body Dressing Independent;within precautions  (using figure 4)   OT: Toilet Transfer/Toileting Modified independent;toilet transfer   OT: Goal 1 Pt will verbalize safe bathing techniques to limit bending.   OT Discharge Planning   OT Plan d/c   OT Discharge Recommendation (DC Rec) home with assist   OT Rationale for DC Rec spouse to assist with light household tasks and children to provide PRN assist  for heavy household tasks. recommend supervision with bathing and medication management initially. recommend pt and family collaborate with PCP for [id number].   OT Brief overview of current status will be IND with BADLs when IND with mobility   Total Session Time   Total Session Time (sum of timed and untimed services) 10

## 2023-04-07 LAB
PATH REPORT.COMMENTS IMP SPEC: NORMAL
PATH REPORT.FINAL DX SPEC: NORMAL
PATH REPORT.GROSS SPEC: NORMAL
PATH REPORT.MICROSCOPIC SPEC OTHER STN: NORMAL
PATH REPORT.RELEVANT HX SPEC: NORMAL
PHOTO IMAGE: NORMAL

## 2023-04-07 PROCEDURE — 88304 TISSUE EXAM BY PATHOLOGIST: CPT | Mod: 26 | Performed by: PATHOLOGY

## 2023-04-12 ENCOUNTER — LAB REQUISITION (OUTPATIENT)
Dept: LAB | Facility: CLINIC | Age: 88
End: 2023-04-12
Payer: MEDICARE

## 2023-04-12 DIAGNOSIS — I10 ESSENTIAL (PRIMARY) HYPERTENSION: ICD-10-CM

## 2023-04-12 DIAGNOSIS — E78.49 OTHER HYPERLIPIDEMIA: ICD-10-CM

## 2023-04-12 LAB
ANION GAP SERPL CALCULATED.3IONS-SCNC: 11 MMOL/L (ref 7–15)
BUN SERPL-MCNC: 13.6 MG/DL (ref 8–23)
CALCIUM SERPL-MCNC: 9 MG/DL (ref 8.2–9.6)
CHLORIDE SERPL-SCNC: 105 MMOL/L (ref 98–107)
CHOLEST SERPL-MCNC: 123 MG/DL
CREAT SERPL-MCNC: 0.87 MG/DL (ref 0.67–1.17)
DEPRECATED HCO3 PLAS-SCNC: 24 MMOL/L (ref 22–29)
GFR SERPL CREATININE-BSD FRML MDRD: 81 ML/MIN/1.73M2
GLUCOSE SERPL-MCNC: 157 MG/DL (ref 70–99)
HDLC SERPL-MCNC: 48 MG/DL
LDLC SERPL CALC-MCNC: 58 MG/DL
NONHDLC SERPL-MCNC: 75 MG/DL
POTASSIUM SERPL-SCNC: 4.3 MMOL/L (ref 3.4–5.3)
SODIUM SERPL-SCNC: 140 MMOL/L (ref 136–145)
TRIGL SERPL-MCNC: 87 MG/DL

## 2023-04-12 PROCEDURE — 80048 BASIC METABOLIC PNL TOTAL CA: CPT | Mod: ORL | Performed by: FAMILY MEDICINE

## 2023-04-12 PROCEDURE — 80061 LIPID PANEL: CPT | Mod: ORL | Performed by: FAMILY MEDICINE

## 2023-04-23 NOTE — DISCHARGE SUMMARY
[facility]  Hospitalist Discharge Summary      Date of Admission:  4/3/2023  Date of Discharge:  4/6/2023  1:40 PM  Discharging Provider: Glenn Renee MD  Discharge Service: Hospitalist Service    Discharge Diagnoses       Blair Quiles is a 89 year old male admitted on 4/3/2023. He presented RUQ pain.        4/6 :         Clinically improved  Discharge home           A/p :         1.  Acute cholecystitis        Choledocholithiasis        Elevated LFTs     Pt presented with the ED with significant post-prandial epigastric pain that he improved with NPO status.    Imaging reviewed and revealed cholelithiasis with GB wall thickening and pericholecystic fluid.       He had elevated LFTs and elevated total bilirubin.    Pain has improved suggesting possible passage of a stone.   MRCP  : no cbd calculi  Gen surgery consulted.   S/p lap raj  Managed with IV Zosyn     Clinically improved, discharge on cipro + flagyl - for 4 days       2.  HTN     We resume norvasc and lisinopril with holding BP parameters.  Will hold PTA hydrochlorothiazide, for now.     3.  Hyperlipidemia     Hold statin for now d/t elevation in LFTs     4.  Daily alcohol use : stable.     5.  Hypokalemia  Will supplement per protocol.  Check mag.   PTA meds do include potassium supplement daily     6.  Bradycardia  Not on b-blockers PTA.  Will supplement electrolytes.  Continue to monitor HR closely on tele monitoring.     7. H/o anxiety  Will resume home medications    8. 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 Recommend followup in 6 months      Follow-ups Needed After Discharge   Follow-up Appointments     Follow-up and recommended labs and tests       With Surgery as needed. A nurse will give you a call in about 1 week.   Please call [phone] with any questions or concerns.         {Additional follow-up instructions/to-do's for PCP    : lft    Unresulted Labs  Ordered in the Past 30 Days of this Admission     No orders found from 3/4/2023 to 4/4/2023.      These results will be followed up by pcp    Discharge Disposition   Discharged to home  Condition at discharge: Stable      Consultations This Hospital Stay   SURGERY GENERAL IP CONSULT  GASTROENTEROLOGY IP CONSULT  CARE MANAGEMENT / SOCIAL WORK IP CONSULT  PHYSICAL THERAPY ADULT IP CONSULT  OCCUPATIONAL THERAPY ADULT IP CONSULT    Code Status   Prior    Time Spent on this Encounter   IGlenn MD, personally saw the patient today and spent greater than 30 minutes discharging this patient.       Glenn Renee MD  [facility]  [address] [address]  Phone: [phone]  Fax: [phone]  ______________________________________________________________________    Physical Exam   Vital Signs:                    Weight: 180 lbs 0 oz     GENERAL: The patient is not in any acute distressed. Awake and alert.  HEENT: Nonicteric sclerae, PERRLA, EOMI. Oropharynx clear. Moist mucous membranes. Conjunctivae appear well perfused.  HEART: Regular rate and rhythm without murmurs.  LUNGS: Clear to auscultation bilaterally. No wheezing or crackles.  ABDOMEN: Soft, positive bowel sounds, nontender.  SKIN: No rash, no excessive bruising, petechiae, or purpura.  EXTREMITIES : no rashes, no swelling in legs.  NEUROLOGIC: conscious and oriented, follows commands, no obvious focal deficits.  ROS: All other systems negative          Primary Care Physician   Cheng Dick    Discharge Orders      Follow-up and recommended labs and tests     With Surgery as needed. A nurse will give you a call in about 1 week. Please call [phone] with any questions or concerns.     Activity    Your activity upon discharge: as tolerated, no lifting more than 20 pounds     Wound care and dressings    Instructions to care for your wound at home: cover as needed for drainage. Ok to leave  open to air. You may shower, no soaking/bathing for 2 weeks.     Reason for your hospital stay    Abdominal pain     Diet    Follow this diet upon discharge: Orders Placed This Encounter      Regular Diet Adult       Significant Results and Procedures   Most Recent 3 CBC's:Recent Labs   Lab Test 04/06/23  0729 04/05/23  1935/23  0904 04/03/23  1805   WBC 7.1  --  4.9 8.7   HGB 15.0  --  15.6 17.0   MCV 95  --  98 94    166 171 193     Most Recent 3 BMP's:Recent Labs   Lab Test 04/12/23  1146 04/06/23  0729 04/05/23  1437 04/05/23  0949 04/05/23  0904    140  --   --  141   POTASSIUM 4.3 3.8  --   --  3.5   CHLORIDE 105 109*  --   --  107   CO2 24 23  --   --  25   BUN 13.6 10  --   --  7*   CR 0.87 0.74  --   --  0.80   ANIONGAP 11 8  --   --  9   LUCAS 9.0 8.1*  --   --  8.2*   * 115 96   < > 93    < > = values in this interval not displayed.     Most Recent 2 LFT's:Recent Labs   Lab Test 04/06/23  0729 04/05/23  0904   AST 36 54*   ALT 75* 117*   ALKPHOS 85 91   BILITOTAL 1.2* 1.4*     Most Recent 3 INR's:Recent Labs   Lab Test 04/05/23  0904 07/03/19  0622   INR 1.12 1.03       Discharge Medications   Discharge Medication List as of 4/6/2023 12:59 PM      START taking these medications    Details   ciprofloxacin (CIPRO) 500 MG tablet Take 1 tablet (500 mg) by mouth 2 times daily for 4 days, Disp-8 tablet, R-0, E-Prescribe      docusate sodium (COLACE) 100 MG capsule Take 1 capsule (100 mg) by mouth 2 times daily, Disp-30 capsule, R-0, E-Prescribe      HYDROcodone-acetaminophen (NORCO) 5-325 MG tablet Take 1 tablet by mouth every 6 hours as needed for pain, Disp-10 tablet, R-0, E-Prescribe      metroNIDAZOLE (FLAGYL) 500 MG tablet Take 1 tablet (500 mg) by mouth 2 times daily for 4 days, Disp-8 tablet, R-0, E-Prescribe         CONTINUE these medications which have NOT CHANGED    Details   amLODIPine (NORVASC) 5 MG tablet [AMLODIPINE (NORVASC) 5 MG TABLET] Take 5 mg by mouth daily.,  Historical      aspirin 81 MG EC tablet [ASPIRIN 81 MG EC TABLET] Take 81 mg by mouth daily., Historical      atorvastatin (LIPITOR) 20 MG tablet [ATORVASTATIN (LIPITOR) 20 MG TABLET] Take 20 mg by mouth at bedtime., Historical      cholecalciferol (VITAMIN D3) 25 mcg (1000 units) capsule Take 1 capsule by mouth daily, Historical      cyanocobalamin (VITAMIN B-12) 100 MCG tablet Take 100 mcg by mouth daily, Historical      ferrous sulfate (FEROSUL) 325 (65 Fe) MG tablet Take 325 mg by mouth daily (with breakfast), Historical      lisinopril-hydrochlorothiazide (PRINZIDE,ZESTORETIC) 20-12.5 mg per tablet [LISINOPRIL-HYDROCHLOROTHIAZIDE (PRINZIDE,ZESTORETIC) 20-12.5 MG PER TABLET] Take 1 tablet by mouth daily., Historical      LORazepam (ATIVAN) 0.5 MG tablet [LORAZEPAM (ATIVAN) 0.5 MG TABLET] Take 0.5 mg by mouth daily as needed for anxiety., Historical      mirtazapine (REMERON) 30 MG tablet [MIRTAZAPINE (REMERON) 30 MG TABLET] Take 30 mg by mouth at bedtime., Historical      multivitamin therapeutic tablet [MULTIVITAMIN THERAPEUTIC TABLET] Take 1 tablet by mouth daily., Historical      potassium chloride (K-DUR,KLOR-CON) 20 MEQ tablet [POTASSIUM CHLORIDE (K-DUR,KLOR-CON) 20 MEQ TABLET] Take 20 mEq by mouth daily., Historical      traZODone (DESYREL) 50 MG tablet [TRAZODONE (DESYREL) 50 MG TABLET] Take 150 mg by mouth at bedtime., Historical           Allergies   No Known Allergies

## 2023-10-18 ENCOUNTER — LAB REQUISITION (OUTPATIENT)
Dept: LAB | Facility: CLINIC | Age: 88
End: 2023-10-18
Payer: MEDICARE

## 2023-10-18 DIAGNOSIS — I10 ESSENTIAL (PRIMARY) HYPERTENSION: ICD-10-CM

## 2023-10-18 DIAGNOSIS — E78.49 OTHER HYPERLIPIDEMIA: ICD-10-CM

## 2023-10-18 PROCEDURE — 83735 ASSAY OF MAGNESIUM: CPT | Mod: ORL | Performed by: FAMILY MEDICINE

## 2023-10-18 PROCEDURE — 80061 LIPID PANEL: CPT | Mod: ORL | Performed by: FAMILY MEDICINE

## 2023-10-18 PROCEDURE — 80048 BASIC METABOLIC PNL TOTAL CA: CPT | Mod: ORL | Performed by: FAMILY MEDICINE

## 2023-10-19 LAB
ANION GAP SERPL CALCULATED.3IONS-SCNC: 12 MMOL/L (ref 7–15)
BUN SERPL-MCNC: 10.5 MG/DL (ref 8–23)
CALCIUM SERPL-MCNC: 8.9 MG/DL (ref 8.2–9.6)
CHLORIDE SERPL-SCNC: 103 MMOL/L (ref 98–107)
CHOLEST SERPL-MCNC: 145 MG/DL
CREAT SERPL-MCNC: 0.94 MG/DL (ref 0.67–1.17)
DEPRECATED HCO3 PLAS-SCNC: 25 MMOL/L (ref 22–29)
EGFRCR SERPLBLD CKD-EPI 2021: 76 ML/MIN/1.73M2
GLUCOSE SERPL-MCNC: 163 MG/DL (ref 70–99)
HDLC SERPL-MCNC: 64 MG/DL
LDLC SERPL CALC-MCNC: 68 MG/DL
MAGNESIUM SERPL-MCNC: 2 MG/DL (ref 1.7–2.3)
NONHDLC SERPL-MCNC: 81 MG/DL
POTASSIUM SERPL-SCNC: 3.6 MMOL/L (ref 3.4–5.3)
SODIUM SERPL-SCNC: 140 MMOL/L (ref 135–145)
TRIGL SERPL-MCNC: 63 MG/DL

## 2024-04-17 ENCOUNTER — LAB REQUISITION (OUTPATIENT)
Dept: LAB | Facility: CLINIC | Age: 89
End: 2024-04-17
Payer: MEDICARE

## 2024-04-17 DIAGNOSIS — D50.8 OTHER IRON DEFICIENCY ANEMIAS: ICD-10-CM

## 2024-04-17 DIAGNOSIS — E78.49 OTHER HYPERLIPIDEMIA: ICD-10-CM

## 2024-04-17 DIAGNOSIS — I10 ESSENTIAL (PRIMARY) HYPERTENSION: ICD-10-CM

## 2024-04-17 PROCEDURE — 80048 BASIC METABOLIC PNL TOTAL CA: CPT | Mod: ORL | Performed by: FAMILY MEDICINE

## 2024-04-17 PROCEDURE — 80061 LIPID PANEL: CPT | Mod: ORL | Performed by: FAMILY MEDICINE

## 2024-04-17 PROCEDURE — 83550 IRON BINDING TEST: CPT | Mod: ORL | Performed by: FAMILY MEDICINE

## 2024-04-19 LAB
ANION GAP SERPL CALCULATED.3IONS-SCNC: 12 MMOL/L (ref 7–15)
BUN SERPL-MCNC: 15.9 MG/DL (ref 8–23)
CALCIUM SERPL-MCNC: 8.8 MG/DL (ref 8.2–9.6)
CHLORIDE SERPL-SCNC: 103 MMOL/L (ref 98–107)
CHOLEST SERPL-MCNC: 143 MG/DL
CREAT SERPL-MCNC: 0.98 MG/DL (ref 0.67–1.17)
DEPRECATED HCO3 PLAS-SCNC: 25 MMOL/L (ref 22–29)
EGFRCR SERPLBLD CKD-EPI 2021: 72 ML/MIN/1.73M2
FASTING STATUS PATIENT QL REPORTED: NORMAL
GLUCOSE SERPL-MCNC: 128 MG/DL (ref 70–99)
HDLC SERPL-MCNC: 60 MG/DL
IRON BINDING CAPACITY (ROCHE): 213 UG/DL (ref 240–430)
IRON SATN MFR SERPL: 69 % (ref 15–46)
IRON SERPL-MCNC: 146 UG/DL (ref 61–157)
LDLC SERPL CALC-MCNC: 70 MG/DL
NONHDLC SERPL-MCNC: 83 MG/DL
POTASSIUM SERPL-SCNC: 3.9 MMOL/L (ref 3.4–5.3)
SODIUM SERPL-SCNC: 140 MMOL/L (ref 135–145)
TRIGL SERPL-MCNC: 66 MG/DL

## 2024-10-17 ENCOUNTER — LAB REQUISITION (OUTPATIENT)
Dept: LAB | Facility: CLINIC | Age: 89
End: 2024-10-17
Payer: MEDICARE

## 2024-10-17 DIAGNOSIS — E78.49 OTHER HYPERLIPIDEMIA: ICD-10-CM

## 2024-10-17 DIAGNOSIS — I10 ESSENTIAL (PRIMARY) HYPERTENSION: ICD-10-CM

## 2024-10-17 LAB
ANION GAP SERPL CALCULATED.3IONS-SCNC: 10 MMOL/L (ref 7–15)
BUN SERPL-MCNC: 13.9 MG/DL (ref 8–23)
CALCIUM SERPL-MCNC: 8.7 MG/DL (ref 8.8–10.4)
CHLORIDE SERPL-SCNC: 107 MMOL/L (ref 98–107)
CHOLEST SERPL-MCNC: 150 MG/DL
CREAT SERPL-MCNC: 1.03 MG/DL (ref 0.67–1.17)
EGFRCR SERPLBLD CKD-EPI 2021: 68 ML/MIN/1.73M2
FASTING STATUS PATIENT QL REPORTED: ABNORMAL
FASTING STATUS PATIENT QL REPORTED: NORMAL
GLUCOSE SERPL-MCNC: 107 MG/DL (ref 70–99)
HCO3 SERPL-SCNC: 26 MMOL/L (ref 22–29)
HDLC SERPL-MCNC: 63 MG/DL
LDLC SERPL CALC-MCNC: 72 MG/DL
NONHDLC SERPL-MCNC: 87 MG/DL
POTASSIUM SERPL-SCNC: 4.1 MMOL/L (ref 3.4–5.3)
SODIUM SERPL-SCNC: 143 MMOL/L (ref 135–145)
TRIGL SERPL-MCNC: 76 MG/DL

## 2024-10-17 PROCEDURE — 80061 LIPID PANEL: CPT | Mod: ORL | Performed by: FAMILY MEDICINE

## 2024-10-17 PROCEDURE — 80048 BASIC METABOLIC PNL TOTAL CA: CPT | Mod: ORL | Performed by: FAMILY MEDICINE

## 2025-03-04 ENCOUNTER — LAB REQUISITION (OUTPATIENT)
Dept: LAB | Facility: CLINIC | Age: OVER 89
End: 2025-03-04
Payer: MEDICARE

## 2025-03-04 DIAGNOSIS — M25.571 PAIN IN RIGHT ANKLE AND JOINTS OF RIGHT FOOT: ICD-10-CM

## 2025-03-04 LAB
CRP SERPL-MCNC: <3 MG/L
ERYTHROCYTE [SEDIMENTATION RATE] IN BLOOD BY WESTERGREN METHOD: 6 MM/HR (ref 0–20)
URATE SERPL-MCNC: 4.8 MG/DL (ref 3.4–7)

## 2025-03-04 PROCEDURE — 85652 RBC SED RATE AUTOMATED: CPT | Mod: ORL | Performed by: FAMILY MEDICINE

## 2025-03-04 PROCEDURE — 86140 C-REACTIVE PROTEIN: CPT | Mod: ORL | Performed by: FAMILY MEDICINE

## 2025-03-04 PROCEDURE — 84550 ASSAY OF BLOOD/URIC ACID: CPT | Mod: ORL | Performed by: FAMILY MEDICINE

## 2025-04-17 ENCOUNTER — LAB REQUISITION (OUTPATIENT)
Dept: LAB | Facility: CLINIC | Age: OVER 89
End: 2025-04-17
Payer: MEDICARE

## 2025-04-17 DIAGNOSIS — E78.49 OTHER HYPERLIPIDEMIA: ICD-10-CM

## 2025-04-17 DIAGNOSIS — I10 ESSENTIAL (PRIMARY) HYPERTENSION: ICD-10-CM

## 2025-04-17 PROCEDURE — 80048 BASIC METABOLIC PNL TOTAL CA: CPT | Mod: ORL | Performed by: FAMILY MEDICINE

## 2025-04-17 PROCEDURE — 80061 LIPID PANEL: CPT | Mod: ORL | Performed by: FAMILY MEDICINE

## 2025-04-18 LAB
ANION GAP SERPL CALCULATED.3IONS-SCNC: 11 MMOL/L (ref 7–15)
BUN SERPL-MCNC: 12 MG/DL (ref 8–23)
CALCIUM SERPL-MCNC: 9 MG/DL (ref 8.8–10.4)
CHLORIDE SERPL-SCNC: 106 MMOL/L (ref 98–107)
CHOLEST SERPL-MCNC: 161 MG/DL
CREAT SERPL-MCNC: 0.99 MG/DL (ref 0.67–1.17)
EGFRCR SERPLBLD CKD-EPI 2021: 71 ML/MIN/1.73M2
FASTING STATUS PATIENT QL REPORTED: ABNORMAL
FASTING STATUS PATIENT QL REPORTED: NORMAL
GLUCOSE SERPL-MCNC: 106 MG/DL (ref 70–99)
HCO3 SERPL-SCNC: 24 MMOL/L (ref 22–29)
HDLC SERPL-MCNC: 73 MG/DL
LDLC SERPL CALC-MCNC: 75 MG/DL
NONHDLC SERPL-MCNC: 88 MG/DL
POTASSIUM SERPL-SCNC: 4.1 MMOL/L (ref 3.4–5.3)
SODIUM SERPL-SCNC: 141 MMOL/L (ref 135–145)
TRIGL SERPL-MCNC: 67 MG/DL

## (undated) DEVICE — SUTURE VICRYL+ 4-0 UNDYED PS-2 VCP496H

## (undated) DEVICE — TUBING SMOKE EVAC PNEUMOCLEAR HIGH FLOW 0620050250

## (undated) DEVICE — ESU PENCIL SMOKE EVAC W/ROCKER SWITCH 0703-047-000

## (undated) DEVICE — SU VICRYL+ 0 27 UR6 VLT VCP603H

## (undated) DEVICE — ENDO TROCAR SLEEVE KII Z-THREADED 05X100MM CTS02

## (undated) DEVICE — BLADE KNIFE SURG 11 371111

## (undated) DEVICE — TIP CAUTERY L HOOK 36CM E377336C

## (undated) DEVICE — SYSTEM LAPAROVUE VISIBILITY LAPVUE10

## (undated) DEVICE — DECANTER VIAL 2006S

## (undated) DEVICE — NDL INSUFFLATION 13GA 120MM C2201

## (undated) DEVICE — A3 SUPPLIES- SEE NURSING INFO PAGE

## (undated) DEVICE — ENDO TROCAR FIRST ENTRY KII FIOS Z-THRD 11X100MM CTF33

## (undated) DEVICE — TUBING LAP SUCT/IRRIG STRYKER 250070500

## (undated) DEVICE — ENDO SHEARS RENEW LAP ENDOCUT SCISSOR TIP 16.5MM 3142

## (undated) DEVICE — CLIP APPLIER ENDO ROTATING 10MM MED/LG ER320

## (undated) DEVICE — PLATE GROUNDING ADULT W/CORD 9165L

## (undated) DEVICE — ADH SKIN CLOSURE PREMIERPRO EXOFIN 1.0ML 3470

## (undated) DEVICE — SUCTION MANIFOLD NEPTUNE 2 SYS 1 PORT 702-025-000

## (undated) DEVICE — SOL WATER IRRIG 1000ML BOTTLE 2F7114

## (undated) DEVICE — SUTURE PASSOR W/GUIDE RSG-14F-4-WG

## (undated) DEVICE — CUSTOM PACK LAP CHOLE SBA5BLCHEA

## (undated) DEVICE — GLOVE UNDER INDICATOR PI SZ 7.0 LF 41670

## (undated) DEVICE — SU VICRYL+ 3-0 27IN SH UND VCP416H

## (undated) DEVICE — ENDO TROCAR FIRST ENTRY KII FIOS Z-THRD 05X100MM CTF03

## (undated) DEVICE — ENDO POUCH UNIV RETRIEVAL SYSTEM INZII 10MM CD001

## (undated) DEVICE — PREP CHLORAPREP 26ML TINTED HI-LITE ORANGE 930815

## (undated) DEVICE — SYR 30ML LL W/O NDL 302832

## (undated) RX ORDER — FENTANYL CITRATE 50 UG/ML
INJECTION, SOLUTION INTRAMUSCULAR; INTRAVENOUS
Status: DISPENSED
Start: 2023-04-05

## (undated) RX ORDER — EPHEDRINE SULFATE 50 MG/ML
INJECTION, SOLUTION INTRAMUSCULAR; INTRAVENOUS; SUBCUTANEOUS
Status: DISPENSED
Start: 2023-04-05

## (undated) RX ORDER — LIDOCAINE HYDROCHLORIDE 10 MG/ML
INJECTION, SOLUTION EPIDURAL; INFILTRATION; INTRACAUDAL; PERINEURAL
Status: DISPENSED
Start: 2023-04-05

## (undated) RX ORDER — FENTANYL CITRATE-0.9 % NACL/PF 10 MCG/ML
PLASTIC BAG, INJECTION (ML) INTRAVENOUS
Status: DISPENSED
Start: 2023-04-05

## (undated) RX ORDER — PROPOFOL 10 MG/ML
INJECTION, EMULSION INTRAVENOUS
Status: DISPENSED
Start: 2023-04-05

## (undated) RX ORDER — ONDANSETRON 2 MG/ML
INJECTION INTRAMUSCULAR; INTRAVENOUS
Status: DISPENSED
Start: 2023-04-05

## (undated) RX ORDER — DEXAMETHASONE SODIUM PHOSPHATE 10 MG/ML
INJECTION, EMULSION INTRAMUSCULAR; INTRAVENOUS
Status: DISPENSED
Start: 2023-04-05